# Patient Record
Sex: MALE | Race: WHITE | NOT HISPANIC OR LATINO
[De-identification: names, ages, dates, MRNs, and addresses within clinical notes are randomized per-mention and may not be internally consistent; named-entity substitution may affect disease eponyms.]

---

## 2018-07-10 ENCOUNTER — APPOINTMENT (OUTPATIENT)
Dept: ORTHOPEDIC SURGERY | Facility: CLINIC | Age: 73
End: 2018-07-10
Payer: MEDICARE

## 2018-07-10 VITALS
HEART RATE: 77 BPM | BODY MASS INDEX: 33.64 KG/M2 | WEIGHT: 235 LBS | HEIGHT: 70 IN | SYSTOLIC BLOOD PRESSURE: 126 MMHG | DIASTOLIC BLOOD PRESSURE: 99 MMHG

## 2018-07-10 PROCEDURE — 20610 DRAIN/INJ JOINT/BURSA W/O US: CPT | Mod: LT

## 2018-07-10 PROCEDURE — 72110 X-RAY EXAM L-2 SPINE 4/>VWS: CPT

## 2018-07-10 PROCEDURE — 72170 X-RAY EXAM OF PELVIS: CPT

## 2018-07-10 PROCEDURE — 99215 OFFICE O/P EST HI 40 MIN: CPT | Mod: 25

## 2018-08-02 ENCOUNTER — APPOINTMENT (OUTPATIENT)
Dept: ORTHOPEDIC SURGERY | Facility: CLINIC | Age: 73
End: 2018-08-02
Payer: MEDICARE

## 2018-08-02 PROCEDURE — 99213 OFFICE O/P EST LOW 20 MIN: CPT

## 2018-08-09 ENCOUNTER — APPOINTMENT (OUTPATIENT)
Dept: ORTHOPEDIC SURGERY | Facility: CLINIC | Age: 73
End: 2018-08-09
Payer: MEDICARE

## 2018-08-09 PROCEDURE — 73110 X-RAY EXAM OF WRIST: CPT | Mod: RT

## 2018-08-09 PROCEDURE — 99214 OFFICE O/P EST MOD 30 MIN: CPT

## 2018-11-27 ENCOUNTER — APPOINTMENT (OUTPATIENT)
Dept: ORTHOPEDIC SURGERY | Facility: CLINIC | Age: 73
End: 2018-11-27
Payer: MEDICARE

## 2018-11-27 PROCEDURE — 73564 X-RAY EXAM KNEE 4 OR MORE: CPT | Mod: 50

## 2018-11-27 PROCEDURE — 99214 OFFICE O/P EST MOD 30 MIN: CPT

## 2018-11-27 PROCEDURE — 73502 X-RAY EXAM HIP UNI 2-3 VIEWS: CPT

## 2018-12-06 ENCOUNTER — APPOINTMENT (OUTPATIENT)
Dept: ORTHOPEDIC SURGERY | Facility: CLINIC | Age: 73
End: 2018-12-06
Payer: MEDICARE

## 2018-12-06 VITALS
DIASTOLIC BLOOD PRESSURE: 72 MMHG | HEIGHT: 70 IN | WEIGHT: 235 LBS | HEART RATE: 91 BPM | BODY MASS INDEX: 33.64 KG/M2 | SYSTOLIC BLOOD PRESSURE: 119 MMHG

## 2018-12-06 DIAGNOSIS — M47.12 OTHER SPONDYLOSIS WITH MYELOPATHY, CERVICAL REGION: ICD-10-CM

## 2018-12-06 DIAGNOSIS — Z86.39 PERSONAL HISTORY OF OTHER ENDOCRINE, NUTRITIONAL AND METABOLIC DISEASE: ICD-10-CM

## 2018-12-06 DIAGNOSIS — Z87.39 PERSONAL HISTORY OF OTHER DISEASES OF THE MUSCULOSKELETAL SYSTEM AND CONNECTIVE TISSUE: ICD-10-CM

## 2018-12-06 DIAGNOSIS — Z86.79 PERSONAL HISTORY OF OTHER DISEASES OF THE CIRCULATORY SYSTEM: ICD-10-CM

## 2018-12-06 DIAGNOSIS — Z60.2 PROBLEMS RELATED TO LIVING ALONE: ICD-10-CM

## 2018-12-06 DIAGNOSIS — Z80.3 FAMILY HISTORY OF MALIGNANT NEOPLASM OF BREAST: ICD-10-CM

## 2018-12-06 DIAGNOSIS — Z78.9 OTHER SPECIFIED HEALTH STATUS: ICD-10-CM

## 2018-12-06 DIAGNOSIS — Z87.19 PERSONAL HISTORY OF OTHER DISEASES OF THE DIGESTIVE SYSTEM: ICD-10-CM

## 2018-12-06 DIAGNOSIS — Z80.42 FAMILY HISTORY OF MALIGNANT NEOPLASM OF PROSTATE: ICD-10-CM

## 2018-12-06 DIAGNOSIS — Z87.891 PERSONAL HISTORY OF NICOTINE DEPENDENCE: ICD-10-CM

## 2018-12-06 PROCEDURE — 99215 OFFICE O/P EST HI 40 MIN: CPT

## 2018-12-06 RX ORDER — AMLODIPINE BESYLATE 5 MG/1
TABLET ORAL
Refills: 0 | Status: ACTIVE | COMMUNITY

## 2018-12-06 RX ORDER — BUTALBITAL, ASPIRIN AND CAFFEINE 50; 325; 40 MG/1; MG/1; MG/1
TABLET ORAL
Refills: 0 | Status: ACTIVE | COMMUNITY

## 2018-12-06 RX ORDER — LOSARTAN POTASSIUM 100 MG/1
TABLET, FILM COATED ORAL
Refills: 0 | Status: ACTIVE | COMMUNITY

## 2018-12-06 RX ORDER — LEVOTHYROXINE SODIUM 0.17 MG/1
TABLET ORAL
Refills: 0 | Status: ACTIVE | COMMUNITY

## 2018-12-06 RX ORDER — SIMVASTATIN 80 MG/1
TABLET, FILM COATED ORAL
Refills: 0 | Status: ACTIVE | COMMUNITY

## 2018-12-06 RX ORDER — METHYLPREDNISOLONE 4 MG/1
4 TABLET ORAL
Qty: 21 | Refills: 0 | Status: DISCONTINUED | COMMUNITY
Start: 2018-07-10 | End: 2018-12-06

## 2018-12-06 RX ORDER — PROPRANOLOL HYDROCHLORIDE 60 MG/1
60 CAPSULE, EXTENDED RELEASE ORAL
Refills: 0 | Status: ACTIVE | COMMUNITY

## 2018-12-06 RX ORDER — ASPIRIN 81 MG
81 TABLET, DELAYED RELEASE (ENTERIC COATED) ORAL
Refills: 0 | Status: ACTIVE | COMMUNITY

## 2018-12-06 SDOH — SOCIAL STABILITY - SOCIAL INSECURITY: PROBLEMS RELATED TO LIVING ALONE: Z60.2

## 2018-12-10 ENCOUNTER — MEDICATION RENEWAL (OUTPATIENT)
Age: 73
End: 2018-12-10

## 2018-12-11 ENCOUNTER — RESULT REVIEW (OUTPATIENT)
Age: 73
End: 2018-12-11

## 2018-12-12 ENCOUNTER — OTHER (OUTPATIENT)
Age: 73
End: 2018-12-12

## 2018-12-13 ENCOUNTER — FORM ENCOUNTER (OUTPATIENT)
Age: 73
End: 2018-12-13

## 2018-12-14 ENCOUNTER — APPOINTMENT (OUTPATIENT)
Dept: MRI IMAGING | Facility: CLINIC | Age: 73
End: 2018-12-14
Payer: MEDICARE

## 2018-12-14 ENCOUNTER — OUTPATIENT (OUTPATIENT)
Dept: OUTPATIENT SERVICES | Facility: HOSPITAL | Age: 73
LOS: 1 days | End: 2018-12-14
Payer: MEDICARE

## 2018-12-14 DIAGNOSIS — Z98.89 OTHER SPECIFIED POSTPROCEDURAL STATES: Chronic | ICD-10-CM

## 2018-12-14 DIAGNOSIS — Z96.60 PRESENCE OF UNSPECIFIED ORTHOPEDIC JOINT IMPLANT: Chronic | ICD-10-CM

## 2018-12-14 DIAGNOSIS — Z96.651 PRESENCE OF RIGHT ARTIFICIAL KNEE JOINT: Chronic | ICD-10-CM

## 2018-12-14 DIAGNOSIS — M47.12 OTHER SPONDYLOSIS WITH MYELOPATHY, CERVICAL REGION: ICD-10-CM

## 2018-12-14 DIAGNOSIS — Z96.659 PRESENCE OF UNSPECIFIED ARTIFICIAL KNEE JOINT: Chronic | ICD-10-CM

## 2018-12-14 PROCEDURE — 72141 MRI NECK SPINE W/O DYE: CPT

## 2018-12-14 PROCEDURE — 72141 MRI NECK SPINE W/O DYE: CPT | Mod: 26

## 2018-12-18 ENCOUNTER — APPOINTMENT (OUTPATIENT)
Dept: ORTHOPEDIC SURGERY | Facility: CLINIC | Age: 73
End: 2018-12-18
Payer: MEDICARE

## 2018-12-18 VITALS — HEIGHT: 70 IN | BODY MASS INDEX: 33.64 KG/M2 | WEIGHT: 235 LBS

## 2018-12-18 PROCEDURE — 20605 DRAIN/INJ JOINT/BURSA W/O US: CPT | Mod: RT

## 2018-12-18 PROCEDURE — 99213 OFFICE O/P EST LOW 20 MIN: CPT | Mod: 25

## 2019-01-07 ENCOUNTER — APPOINTMENT (OUTPATIENT)
Dept: NEUROLOGY | Facility: CLINIC | Age: 74
End: 2019-01-07

## 2019-01-28 ENCOUNTER — APPOINTMENT (OUTPATIENT)
Dept: ORTHOPEDIC SURGERY | Facility: CLINIC | Age: 74
End: 2019-01-28

## 2019-03-05 ENCOUNTER — INPATIENT (INPATIENT)
Facility: HOSPITAL | Age: 74
LOS: 1 days | Discharge: HOME | DRG: 194 | End: 2019-03-07
Attending: INTERNAL MEDICINE | Admitting: INTERNAL MEDICINE
Payer: MEDICARE

## 2019-03-05 VITALS
HEIGHT: 70 IN | SYSTOLIC BLOOD PRESSURE: 157 MMHG | TEMPERATURE: 102 F | OXYGEN SATURATION: 90 % | DIASTOLIC BLOOD PRESSURE: 84 MMHG | HEART RATE: 100 BPM | WEIGHT: 229.94 LBS

## 2019-03-05 DIAGNOSIS — Z98.89 OTHER SPECIFIED POSTPROCEDURAL STATES: Chronic | ICD-10-CM

## 2019-03-05 DIAGNOSIS — Z96.659 PRESENCE OF UNSPECIFIED ARTIFICIAL KNEE JOINT: Chronic | ICD-10-CM

## 2019-03-05 DIAGNOSIS — J10.1 INFLUENZA DUE TO OTHER IDENTIFIED INFLUENZA VIRUS WITH OTHER RESPIRATORY MANIFESTATIONS: ICD-10-CM

## 2019-03-05 DIAGNOSIS — Z96.60 PRESENCE OF UNSPECIFIED ORTHOPEDIC JOINT IMPLANT: Chronic | ICD-10-CM

## 2019-03-05 DIAGNOSIS — Z96.651 PRESENCE OF RIGHT ARTIFICIAL KNEE JOINT: Chronic | ICD-10-CM

## 2019-03-05 LAB
ALBUMIN SERPL ELPH-MCNC: 3.7 G/DL — SIGNIFICANT CHANGE UP (ref 3.3–5)
ALP SERPL-CCNC: 61 U/L — SIGNIFICANT CHANGE UP (ref 30–120)
ALT FLD-CCNC: 35 U/L DA — SIGNIFICANT CHANGE UP (ref 10–60)
ANION GAP SERPL CALC-SCNC: 13 MMOL/L — SIGNIFICANT CHANGE UP (ref 5–17)
APPEARANCE UR: CLEAR — SIGNIFICANT CHANGE UP
APTT BLD: 29.8 SEC — SIGNIFICANT CHANGE UP (ref 28.5–37)
AST SERPL-CCNC: 38 U/L — SIGNIFICANT CHANGE UP (ref 10–40)
BASOPHILS # BLD AUTO: 0.03 K/UL — SIGNIFICANT CHANGE UP (ref 0–0.2)
BASOPHILS NFR BLD AUTO: 0.4 % — SIGNIFICANT CHANGE UP (ref 0–2)
BILIRUB SERPL-MCNC: 0.4 MG/DL — SIGNIFICANT CHANGE UP (ref 0.2–1.2)
BILIRUB UR-MCNC: NEGATIVE — SIGNIFICANT CHANGE UP
BUN SERPL-MCNC: 15 MG/DL — SIGNIFICANT CHANGE UP (ref 7–23)
CALCIUM SERPL-MCNC: 8.6 MG/DL — SIGNIFICANT CHANGE UP (ref 8.4–10.5)
CHLORIDE SERPL-SCNC: 100 MMOL/L — SIGNIFICANT CHANGE UP (ref 96–108)
CO2 SERPL-SCNC: 22 MMOL/L — SIGNIFICANT CHANGE UP (ref 22–31)
COLOR SPEC: YELLOW — SIGNIFICANT CHANGE UP
CREAT SERPL-MCNC: 1.13 MG/DL — SIGNIFICANT CHANGE UP (ref 0.5–1.3)
D DIMER BLD IA.RAPID-MCNC: 2092 NG/ML DDU — HIGH
DIFF PNL FLD: ABNORMAL
EOSINOPHIL # BLD AUTO: 0.02 K/UL — SIGNIFICANT CHANGE UP (ref 0–0.5)
EOSINOPHIL NFR BLD AUTO: 0.3 % — SIGNIFICANT CHANGE UP (ref 0–6)
FLU A RESULT: DETECTED
FLU A RESULT: DETECTED
FLUAV AG NPH QL: DETECTED
FLUBV AG NPH QL: SIGNIFICANT CHANGE UP
GLUCOSE SERPL-MCNC: 117 MG/DL — HIGH (ref 70–99)
GLUCOSE UR QL: NEGATIVE MG/DL — SIGNIFICANT CHANGE UP
HCT VFR BLD CALC: 34.5 % — LOW (ref 39–50)
HGB BLD-MCNC: 11.9 G/DL — LOW (ref 13–17)
IMM GRANULOCYTES NFR BLD AUTO: 0.3 % — SIGNIFICANT CHANGE UP (ref 0–1.5)
INR BLD: 1.24 RATIO — HIGH (ref 0.88–1.16)
KETONES UR-MCNC: ABNORMAL
LACTATE SERPL-SCNC: 1.4 MMOL/L — SIGNIFICANT CHANGE UP (ref 0.7–2)
LEUKOCYTE ESTERASE UR-ACNC: NEGATIVE — SIGNIFICANT CHANGE UP
LYMPHOCYTES # BLD AUTO: 0.45 K/UL — LOW (ref 1–3.3)
LYMPHOCYTES # BLD AUTO: 6.7 % — LOW (ref 13–44)
MAGNESIUM SERPL-MCNC: 2 MG/DL — SIGNIFICANT CHANGE UP (ref 1.6–2.6)
MCHC RBC-ENTMCNC: 32.2 PG — SIGNIFICANT CHANGE UP (ref 27–34)
MCHC RBC-ENTMCNC: 34.5 GM/DL — SIGNIFICANT CHANGE UP (ref 32–36)
MCV RBC AUTO: 93.2 FL — SIGNIFICANT CHANGE UP (ref 80–100)
MONOCYTES # BLD AUTO: 0.77 K/UL — SIGNIFICANT CHANGE UP (ref 0–0.9)
MONOCYTES NFR BLD AUTO: 11.5 % — SIGNIFICANT CHANGE UP (ref 2–14)
NEUTROPHILS # BLD AUTO: 5.4 K/UL — SIGNIFICANT CHANGE UP (ref 1.8–7.4)
NEUTROPHILS NFR BLD AUTO: 80.8 % — HIGH (ref 43–77)
NITRITE UR-MCNC: NEGATIVE — SIGNIFICANT CHANGE UP
NRBC # BLD: 0 /100 WBCS — SIGNIFICANT CHANGE UP (ref 0–0)
NT-PROBNP SERPL-SCNC: 417 PG/ML — SIGNIFICANT CHANGE UP (ref 0–450)
PH UR: 6 — SIGNIFICANT CHANGE UP (ref 5–8)
PLATELET # BLD AUTO: 146 K/UL — LOW (ref 150–400)
POTASSIUM SERPL-MCNC: 3.5 MMOL/L — SIGNIFICANT CHANGE UP (ref 3.5–5.3)
POTASSIUM SERPL-SCNC: 3.5 MMOL/L — SIGNIFICANT CHANGE UP (ref 3.5–5.3)
PROT SERPL-MCNC: 7.1 G/DL — SIGNIFICANT CHANGE UP (ref 6–8.3)
PROT UR-MCNC: 30 MG/DL
PROTHROM AB SERPL-ACNC: 13.6 SEC — HIGH (ref 10–12.9)
RBC # BLD: 3.7 M/UL — LOW (ref 4.2–5.8)
RBC # FLD: 13.6 % — SIGNIFICANT CHANGE UP (ref 10.3–14.5)
RSV RESULT: SIGNIFICANT CHANGE UP
RSV RNA RESP QL NAA+PROBE: SIGNIFICANT CHANGE UP
SODIUM SERPL-SCNC: 135 MMOL/L — SIGNIFICANT CHANGE UP (ref 135–145)
SP GR SPEC: 1.01 — SIGNIFICANT CHANGE UP (ref 1.01–1.02)
TROPONIN I SERPL-MCNC: 0.03 NG/ML — SIGNIFICANT CHANGE UP (ref 0.02–0.06)
TROPONIN I SERPL-MCNC: 0.49 NG/ML — HIGH (ref 0.02–0.06)
TROPONIN I SERPL-MCNC: 0.51 NG/ML — HIGH (ref 0.02–0.06)
UROBILINOGEN FLD QL: NEGATIVE MG/DL — SIGNIFICANT CHANGE UP
WBC # BLD: 6.69 K/UL — SIGNIFICANT CHANGE UP (ref 3.8–10.5)
WBC # FLD AUTO: 6.69 K/UL — SIGNIFICANT CHANGE UP (ref 3.8–10.5)

## 2019-03-05 PROCEDURE — 93010 ELECTROCARDIOGRAM REPORT: CPT

## 2019-03-05 PROCEDURE — 73562 X-RAY EXAM OF KNEE 3: CPT | Mod: 26,50

## 2019-03-05 PROCEDURE — 71045 X-RAY EXAM CHEST 1 VIEW: CPT | Mod: 26

## 2019-03-05 PROCEDURE — 71275 CT ANGIOGRAPHY CHEST: CPT | Mod: 26

## 2019-03-05 PROCEDURE — 73521 X-RAY EXAM HIPS BI 2 VIEWS: CPT | Mod: 26

## 2019-03-05 PROCEDURE — 99285 EMERGENCY DEPT VISIT HI MDM: CPT

## 2019-03-05 RX ORDER — PROPRANOLOL HCL 160 MG
60 CAPSULE, EXTENDED RELEASE 24HR ORAL DAILY
Qty: 0 | Refills: 0 | Status: DISCONTINUED | OUTPATIENT
Start: 2019-03-05 | End: 2019-03-07

## 2019-03-05 RX ORDER — LEVOTHYROXINE SODIUM 125 MCG
25 TABLET ORAL DAILY
Qty: 0 | Refills: 0 | Status: DISCONTINUED | OUTPATIENT
Start: 2019-03-05 | End: 2019-03-07

## 2019-03-05 RX ORDER — PANTOPRAZOLE SODIUM 20 MG/1
40 TABLET, DELAYED RELEASE ORAL
Qty: 0 | Refills: 0 | Status: DISCONTINUED | OUTPATIENT
Start: 2019-03-05 | End: 2019-03-05

## 2019-03-05 RX ORDER — ENOXAPARIN SODIUM 100 MG/ML
40 INJECTION SUBCUTANEOUS DAILY
Qty: 0 | Refills: 0 | Status: DISCONTINUED | OUTPATIENT
Start: 2019-03-05 | End: 2019-03-07

## 2019-03-05 RX ORDER — ACETAMINOPHEN 500 MG
975 TABLET ORAL ONCE
Qty: 0 | Refills: 0 | Status: COMPLETED | OUTPATIENT
Start: 2019-03-05 | End: 2019-03-05

## 2019-03-05 RX ORDER — SIMVASTATIN 20 MG/1
40 TABLET, FILM COATED ORAL AT BEDTIME
Qty: 0 | Refills: 0 | Status: DISCONTINUED | OUTPATIENT
Start: 2019-03-05 | End: 2019-03-07

## 2019-03-05 RX ORDER — CLOPIDOGREL BISULFATE 75 MG/1
75 TABLET, FILM COATED ORAL DAILY
Qty: 0 | Refills: 0 | Status: DISCONTINUED | OUTPATIENT
Start: 2019-03-05 | End: 2019-03-07

## 2019-03-05 RX ORDER — ACETAMINOPHEN 500 MG
650 TABLET ORAL ONCE
Qty: 0 | Refills: 0 | Status: DISCONTINUED | OUTPATIENT
Start: 2019-03-05 | End: 2019-03-05

## 2019-03-05 RX ORDER — SODIUM CHLORIDE 9 MG/ML
1000 INJECTION INTRAMUSCULAR; INTRAVENOUS; SUBCUTANEOUS ONCE
Qty: 0 | Refills: 0 | Status: COMPLETED | OUTPATIENT
Start: 2019-03-05 | End: 2019-03-05

## 2019-03-05 RX ORDER — ACETAMINOPHEN 500 MG
975 TABLET ORAL EVERY 8 HOURS
Qty: 0 | Refills: 0 | Status: DISCONTINUED | OUTPATIENT
Start: 2019-03-05 | End: 2019-03-07

## 2019-03-05 RX ORDER — PANTOPRAZOLE SODIUM 20 MG/1
40 TABLET, DELAYED RELEASE ORAL
Qty: 0 | Refills: 0 | Status: DISCONTINUED | OUTPATIENT
Start: 2019-03-05 | End: 2019-03-07

## 2019-03-05 RX ORDER — AMLODIPINE BESYLATE 2.5 MG/1
5 TABLET ORAL DAILY
Qty: 0 | Refills: 0 | Status: DISCONTINUED | OUTPATIENT
Start: 2019-03-05 | End: 2019-03-07

## 2019-03-05 RX ORDER — TAMSULOSIN HYDROCHLORIDE 0.4 MG/1
0.4 CAPSULE ORAL AT BEDTIME
Qty: 0 | Refills: 0 | Status: DISCONTINUED | OUTPATIENT
Start: 2019-03-05 | End: 2019-03-07

## 2019-03-05 RX ORDER — LOSARTAN POTASSIUM 100 MG/1
100 TABLET, FILM COATED ORAL DAILY
Qty: 0 | Refills: 0 | Status: DISCONTINUED | OUTPATIENT
Start: 2019-03-05 | End: 2019-03-07

## 2019-03-05 RX ORDER — ASPIRIN/CALCIUM CARB/MAGNESIUM 324 MG
81 TABLET ORAL DAILY
Qty: 0 | Refills: 0 | Status: DISCONTINUED | OUTPATIENT
Start: 2019-03-05 | End: 2019-03-07

## 2019-03-05 RX ADMIN — Medication 975 MILLIGRAM(S): at 01:05

## 2019-03-05 RX ADMIN — Medication 975 MILLIGRAM(S): at 18:26

## 2019-03-05 RX ADMIN — Medication 75 MILLIGRAM(S): at 02:29

## 2019-03-05 RX ADMIN — Medication 975 MILLIGRAM(S): at 01:04

## 2019-03-05 RX ADMIN — Medication 975 MILLIGRAM(S): at 10:00

## 2019-03-05 RX ADMIN — SODIUM CHLORIDE 1000 MILLILITER(S): 9 INJECTION INTRAMUSCULAR; INTRAVENOUS; SUBCUTANEOUS at 02:05

## 2019-03-05 RX ADMIN — Medication 25 MICROGRAM(S): at 10:35

## 2019-03-05 RX ADMIN — PANTOPRAZOLE SODIUM 40 MILLIGRAM(S): 20 TABLET, DELAYED RELEASE ORAL at 10:36

## 2019-03-05 RX ADMIN — Medication 975 MILLIGRAM(S): at 17:35

## 2019-03-05 RX ADMIN — SODIUM CHLORIDE 1000 MILLILITER(S): 9 INJECTION INTRAMUSCULAR; INTRAVENOUS; SUBCUTANEOUS at 03:05

## 2019-03-05 RX ADMIN — Medication 75 MILLIGRAM(S): at 10:36

## 2019-03-05 RX ADMIN — TAMSULOSIN HYDROCHLORIDE 0.4 MILLIGRAM(S): 0.4 CAPSULE ORAL at 21:49

## 2019-03-05 RX ADMIN — Medication 975 MILLIGRAM(S): at 09:19

## 2019-03-05 RX ADMIN — SODIUM CHLORIDE 1000 MILLILITER(S): 9 INJECTION INTRAMUSCULAR; INTRAVENOUS; SUBCUTANEOUS at 01:05

## 2019-03-05 RX ADMIN — SIMVASTATIN 40 MILLIGRAM(S): 20 TABLET, FILM COATED ORAL at 21:49

## 2019-03-05 RX ADMIN — CLOPIDOGREL BISULFATE 75 MILLIGRAM(S): 75 TABLET, FILM COATED ORAL at 10:36

## 2019-03-05 RX ADMIN — Medication 81 MILLIGRAM(S): at 10:36

## 2019-03-05 RX ADMIN — ENOXAPARIN SODIUM 40 MILLIGRAM(S): 100 INJECTION SUBCUTANEOUS at 10:40

## 2019-03-05 NOTE — H&P ADULT - HISTORY OF PRESENT ILLNESS
73 male from home  brought by ems as 2nd fall as legs weeak and ongoing uri with high fever now  + flu vaccine - has + flu by swab here  well untill 2 days ago with mild uri sx that progressed mostly with weakness  no ha cp sob rash dysuria +dry cough +low fever -now high no ear pains  no bowel issue no edema  stable mild oa and back pains and leg weakness  stable mild hearing loss and tinitus  no bleeding bruising heartburn  does have vines -tested for cardiac for expected back surgery (as lumbar radiculopathy with some foot drop -somewhat improved now, found with cad 2 months ago and stents placed, does have some vines with stairs but not at rest, had retesting by cardio dr jaxsonr -results not known offically at this time.   no cp palpitations orthopnea pnd    hx htn lipid cad hypothyroid oa radiculopathy gerd samantha overwt foot drop bph migraines

## 2019-03-05 NOTE — H&P ADULT - FAMILY HISTORY
Family history of breast cancer, with mets     Family history of type 2 diabetes mellitus     Family history of prostate cancer     Sibling  Still living? Yes, Estimated age:   Family history of dementia, Age at diagnosis: Age Unknown  Family history of hypertension, Age at diagnosis: Age Unknown

## 2019-03-05 NOTE — ED PROVIDER NOTE - PSH
S/P carpal tunnel release  left with ulnar nerve transposition 2009  S/P carpal tunnel release  right with ulnar nerve transposition 2009  S/P colon resection  with temporary colostomy for benign mass 1993  S/P colonoscopy  2 years ago, negative  S/P endoscopy  April 2015, mild gastritis  S/P knee replacement  left 4/2014  S/P tonsillectomy and adenoidectomy  as child  S/P total hip arthroplasty  left 2013  S/P total hip arthroplasty  right 2012  S/P total knee arthroplasty, right  2011

## 2019-03-05 NOTE — H&P ADULT - NSHPLABSRESULTS_GEN_ALL_CORE
sugar 117  bun/crat ok lytes normal  lactate normal  wbc 6.9  hgb 11.9  ua neg  ct chest  no pe   slight patchy infiltrates -prob atelectasis    small nodue (has hx of this)    ekg reportedly sinus    repeat ordered as no available

## 2019-03-05 NOTE — H&P ADULT - NSHPPHYSICALEXAM_GEN_ALL_CORE
awake alert clear mentation  pink  120/70  102  no jvd  no nodes   lungs clear   heart reg  barrel chested  abd large soft tympantin  no hernia no masses  no edema  no rash    decline rectal   throat slight redness  hearing grossly intact  sight intact  not walked at this time but no gross deficits

## 2019-03-05 NOTE — ED PROVIDER NOTE - OBJECTIVE STATEMENT
73 y.o. M [facility] for weakness 73 y.o. M BIB EMS for weakness - about 1.5months ago had 2 stents placed after abn stress test, since then has had sensation of SOB, last week had another angio to check stents and they were clean, now 2d cough and rhinorrhea, today fell 3times due to leg weakness, says just can't stand up, has b/l hip replacements and 73 y.o. M [facility] for weakness - about 1.5months ago had 2 stents placed after abn stress test, since then has had sensation of SOB, last week had another angio to check stents and they were clean, now 2d cough and rhinorrhea, today fell 3 times due to leg weakness, says just can't stand up, has b/l hip replacements and hips hurt now, not sure if that is his baseline or from the fall, has been taking robitussin dm and benedryl for URI symptoms

## 2019-03-05 NOTE — ED ADULT NURSE NOTE - PMH
Dyslipidemia    Gastroesophageal reflux disease without esophagitis  controlled on omeprazole  Heart murmur  as child  Hernia of abdominal wall    Hypertension    Hypothyroid    Migraine    Obesity    NISHI on CPAP  unsure setting  Osteoarthritis    Stented coronary artery    Tachycardia  12 years ago  Wrist pain, right

## 2019-03-05 NOTE — ED PROVIDER NOTE - PMH
Dyslipidemia    Gastroesophageal reflux disease without esophagitis  controlled on omeprazole  Heart murmur  as child  Hernia of abdominal wall    Hypertension    Hypothyroid    Migraine    Obesity    NISHI on CPAP  unsure setting  Osteoarthritis    Tachycardia  12 years ago  Wrist pain, right

## 2019-03-05 NOTE — ED ADULT NURSE NOTE - FAMILY HISTORY
Mother  Still living? No  Family history of breast cancer, Age at diagnosis: Age Unknown  Family history of type 2 diabetes mellitus, Age at diagnosis: Age Unknown     Father  Still living? No  Family history of prostate cancer, Age at diagnosis: Age Unknown     Sibling  Still living? Yes, Estimated age:   Family history of dementia, Age at diagnosis: Age Unknown  Family history of hypertension, Age at diagnosis: Age Unknown

## 2019-03-06 ENCOUNTER — TRANSCRIPTION ENCOUNTER (OUTPATIENT)
Age: 74
End: 2019-03-06

## 2019-03-06 DIAGNOSIS — I25.10 ATHEROSCLEROTIC HEART DISEASE OF NATIVE CORONARY ARTERY WITHOUT ANGINA PECTORIS: ICD-10-CM

## 2019-03-06 DIAGNOSIS — R74.8 ABNORMAL LEVELS OF OTHER SERUM ENZYMES: ICD-10-CM

## 2019-03-06 LAB
ALBUMIN SERPL ELPH-MCNC: 3.1 G/DL — LOW (ref 3.3–5)
ALP SERPL-CCNC: 52 U/L — SIGNIFICANT CHANGE UP (ref 30–120)
ALT FLD-CCNC: 34 U/L DA — SIGNIFICANT CHANGE UP (ref 10–60)
ANION GAP SERPL CALC-SCNC: 12 MMOL/L — SIGNIFICANT CHANGE UP (ref 5–17)
ANION GAP SERPL CALC-SCNC: 9 MMOL/L — SIGNIFICANT CHANGE UP (ref 5–17)
AST SERPL-CCNC: 51 U/L — HIGH (ref 10–40)
BILIRUB SERPL-MCNC: 0.2 MG/DL — SIGNIFICANT CHANGE UP (ref 0.2–1.2)
BUN SERPL-MCNC: 12 MG/DL — SIGNIFICANT CHANGE UP (ref 7–23)
BUN SERPL-MCNC: 13 MG/DL — SIGNIFICANT CHANGE UP (ref 7–23)
CALCIUM SERPL-MCNC: 7.9 MG/DL — LOW (ref 8.4–10.5)
CALCIUM SERPL-MCNC: 8 MG/DL — LOW (ref 8.4–10.5)
CHLORIDE SERPL-SCNC: 104 MMOL/L — SIGNIFICANT CHANGE UP (ref 96–108)
CHLORIDE SERPL-SCNC: 105 MMOL/L — SIGNIFICANT CHANGE UP (ref 96–108)
CHOLEST SERPL-MCNC: 118 MG/DL — SIGNIFICANT CHANGE UP (ref 10–199)
CO2 SERPL-SCNC: 22 MMOL/L — SIGNIFICANT CHANGE UP (ref 22–31)
CO2 SERPL-SCNC: 25 MMOL/L — SIGNIFICANT CHANGE UP (ref 22–31)
CREAT SERPL-MCNC: 0.9 MG/DL — SIGNIFICANT CHANGE UP (ref 0.5–1.3)
CREAT SERPL-MCNC: 0.93 MG/DL — SIGNIFICANT CHANGE UP (ref 0.5–1.3)
GLUCOSE SERPL-MCNC: 114 MG/DL — HIGH (ref 70–99)
GLUCOSE SERPL-MCNC: 96 MG/DL — SIGNIFICANT CHANGE UP (ref 70–99)
HCT VFR BLD CALC: 32.6 % — LOW (ref 39–50)
HDLC SERPL-MCNC: 37 MG/DL — LOW
HGB BLD-MCNC: 11.1 G/DL — LOW (ref 13–17)
LIPID PNL WITH DIRECT LDL SERPL: 56 MG/DL — SIGNIFICANT CHANGE UP
MCHC RBC-ENTMCNC: 31.2 PG — SIGNIFICANT CHANGE UP (ref 27–34)
MCHC RBC-ENTMCNC: 34 GM/DL — SIGNIFICANT CHANGE UP (ref 32–36)
MCV RBC AUTO: 91.6 FL — SIGNIFICANT CHANGE UP (ref 80–100)
NRBC # BLD: 0 /100 WBCS — SIGNIFICANT CHANGE UP (ref 0–0)
PLATELET # BLD AUTO: 135 K/UL — LOW (ref 150–400)
POTASSIUM SERPL-MCNC: 3.1 MMOL/L — LOW (ref 3.5–5.3)
POTASSIUM SERPL-MCNC: 3.7 MMOL/L — SIGNIFICANT CHANGE UP (ref 3.5–5.3)
POTASSIUM SERPL-SCNC: 3.1 MMOL/L — LOW (ref 3.5–5.3)
POTASSIUM SERPL-SCNC: 3.7 MMOL/L — SIGNIFICANT CHANGE UP (ref 3.5–5.3)
PROT SERPL-MCNC: 6 G/DL — SIGNIFICANT CHANGE UP (ref 6–8.3)
RBC # BLD: 3.56 M/UL — LOW (ref 4.2–5.8)
RBC # FLD: 13.9 % — SIGNIFICANT CHANGE UP (ref 10.3–14.5)
SODIUM SERPL-SCNC: 138 MMOL/L — SIGNIFICANT CHANGE UP (ref 135–145)
SODIUM SERPL-SCNC: 139 MMOL/L — SIGNIFICANT CHANGE UP (ref 135–145)
T4 FREE SERPL-MCNC: 1.1 NG/DL — SIGNIFICANT CHANGE UP (ref 0.9–1.8)
TOTAL CHOLESTEROL/HDL RATIO MEASUREMENT: 3.2 RATIO — LOW (ref 3.4–9.6)
TRIGL SERPL-MCNC: 124 MG/DL — SIGNIFICANT CHANGE UP (ref 10–149)
TROPONIN I SERPL-MCNC: 0.21 NG/ML — HIGH (ref 0.02–0.06)
TSH SERPL-MCNC: 4.91 UIU/ML — HIGH (ref 0.27–4.2)
VIT B12 SERPL-MCNC: 608 PG/ML — SIGNIFICANT CHANGE UP (ref 232–1245)
WBC # BLD: 3.35 K/UL — LOW (ref 3.8–10.5)
WBC # FLD AUTO: 3.35 K/UL — LOW (ref 3.8–10.5)

## 2019-03-06 PROCEDURE — 99223 1ST HOSP IP/OBS HIGH 75: CPT

## 2019-03-06 PROCEDURE — 93010 ELECTROCARDIOGRAM REPORT: CPT

## 2019-03-06 PROCEDURE — 12345: CPT | Mod: NC

## 2019-03-06 RX ORDER — POTASSIUM CHLORIDE 20 MEQ
20 PACKET (EA) ORAL ONCE
Qty: 0 | Refills: 0 | Status: COMPLETED | OUTPATIENT
Start: 2019-03-06 | End: 2019-03-06

## 2019-03-06 RX ORDER — POLYETHYLENE GLYCOL 3350 17 G/17G
17 POWDER, FOR SOLUTION ORAL DAILY
Qty: 0 | Refills: 0 | Status: DISCONTINUED | OUTPATIENT
Start: 2019-03-06 | End: 2019-03-07

## 2019-03-06 RX ADMIN — SIMVASTATIN 40 MILLIGRAM(S): 20 TABLET, FILM COATED ORAL at 21:25

## 2019-03-06 RX ADMIN — PANTOPRAZOLE SODIUM 40 MILLIGRAM(S): 20 TABLET, DELAYED RELEASE ORAL at 06:54

## 2019-03-06 RX ADMIN — Medication 20 MILLIEQUIVALENT(S): at 17:06

## 2019-03-06 RX ADMIN — ENOXAPARIN SODIUM 40 MILLIGRAM(S): 100 INJECTION SUBCUTANEOUS at 11:11

## 2019-03-06 RX ADMIN — TAMSULOSIN HYDROCHLORIDE 0.4 MILLIGRAM(S): 0.4 CAPSULE ORAL at 21:25

## 2019-03-06 RX ADMIN — Medication 60 MILLIGRAM(S): at 06:50

## 2019-03-06 RX ADMIN — Medication 25 MICROGRAM(S): at 06:50

## 2019-03-06 RX ADMIN — Medication 20 MILLIEQUIVALENT(S): at 12:39

## 2019-03-06 RX ADMIN — POLYETHYLENE GLYCOL 3350 17 GRAM(S): 17 POWDER, FOR SOLUTION ORAL at 11:12

## 2019-03-06 RX ADMIN — CLOPIDOGREL BISULFATE 75 MILLIGRAM(S): 75 TABLET, FILM COATED ORAL at 11:12

## 2019-03-06 RX ADMIN — Medication 75 MILLIGRAM(S): at 06:50

## 2019-03-06 RX ADMIN — Medication 75 MILLIGRAM(S): at 11:12

## 2019-03-06 RX ADMIN — Medication 200 MILLIGRAM(S): at 22:02

## 2019-03-06 RX ADMIN — Medication 81 MILLIGRAM(S): at 11:12

## 2019-03-06 NOTE — PROVIDER CONTACT NOTE (OTHER) - ACTION/TREATMENT ORDERED:
as per MD Mayer give one dose of potassium 20MEQ PO now and one dose of 20MEQ of potassium PO to be given at dinner time. repeat BMP at 2000 hours. will continue to monitor

## 2019-03-06 NOTE — PROGRESS NOTE ADULT - SUBJECTIVE AND OBJECTIVE BOX
med    improving   no fever  better strength  able to stand and take few steps to bathroom  recent cad and stents   troponin added and elevated   no sx    seen by cardio  some low bp lyesterday    a+ox3  nontoxic    seems much better  no cp sob orhtopnea pnd  no productive couch sob    heart reg  lungs clear  abd soft   no edema  pink warm  98   120/70  no edema  voids ok      wishes miralax    imp  flu -improving  cad  +troponin related to flu effects      for PT and to see walking strength assessment as weak and falls at home with initial illness  ?home tomorrow    dr cotter

## 2019-03-06 NOTE — DISCHARGE NOTE NURSING/CASE MANAGEMENT/SOCIAL WORK - NSSCNAMETXT_GEN_ALL_CORE
You are DECLINING HOME CARE and OPTING to CONTINUE with OUTPATIENT PT @  [facility] [phone]

## 2019-03-06 NOTE — CONSULT NOTE ADULT - PROBLEM SELECTOR RECOMMENDATION 2
s/p recent intervention and no new symptoms.  Current tropinin elevation likely due to increased demand and influenza. No need currently for further intervention unless new symptoms or issues arise.  Repeat ECG oliverio.

## 2019-03-06 NOTE — DISCHARGE NOTE NURSING/CASE MANAGEMENT/SOCIAL WORK - NSDCDPATPORTLINK_GEN_ALL_CORE
You can access the [facility] Patient Portal, offered by [facility], by registering with the following website: [email or web address]

## 2019-03-06 NOTE — PROVIDER CONTACT NOTE (OTHER) - ASSESSMENT
axox4, denies chest pain, sob, no complaints at this time tele monitor maintained in place with NSR. VSS. asymptomatic at this time

## 2019-03-06 NOTE — DISCHARGE NOTE PROVIDER - HOSPITAL COURSE
assessed and treated   cardio and PT assessment        improved 73 male with weakness and recent stent for cad -found with flu a;  assessed and treated   cardio and PT    had transient troponin elevation and low potassium -improved; elevated tsh noted            clinically improved

## 2019-03-06 NOTE — DISCHARGE NOTE PROVIDER - NSDCCPCAREPLAN_GEN_ALL_CORE_FT
PRINCIPAL DISCHARGE DIAGNOSIS  Problem: Influenza A  Assessment and Plan of Treatment:       SECONDARY DISCHARGE DIAGNOSES  Problem: Weakness  Assessment and Plan of Treatment:

## 2019-03-06 NOTE — CONSULT NOTE ADULT - SUBJECTIVE AND OBJECTIVE BOX
HPI:  73 male from home  brought by ems as 2nd fall as legs weeak and ongoing uri with high fever + flu vaccine and now + flu.  Reports well until 2 days ago with mild uri sx that progressed mostly with weakness, +dry cough +low fever. Cardiology consulted due to elevated tropinin. Denies Denies chest pain, orthopnea, paroxysmal nocturnal dyspnea, dizziness, lightheadedness, claudication, pre-syncope or syncope. Reports recently had + stress test and had CC at [place].  Stents placed.  Still had more SOB after and had to go back for repeat procedure and reports results were good.  Never had angina.  has RICHMOND at baseline and this is no different now. Primary cardiologist (Dr. Mat Weston)    PAST MEDICAL & SURGICAL HISTORY:  Stented coronary artery  Gastroesophageal reflux disease without esophagitis: controlled on omeprazole  Wrist pain, right  Obesity  Hernia of abdominal wall  Migraine  NISHI on CPAP: unsure setting  Heart murmur: as child  Tachycardia: 12 years ago  Hypothyroid  Dyslipidemia  Hypertension  Osteoarthritis  S/P knee replacement: left 2014  S/P colonoscopy: 2 years ago, negative  S/P endoscopy: 2015, mild gastritis  S/P tonsillectomy and adenoidectomy: as child  S/P total hip arthroplasty: left   S/P total hip arthroplasty: right   S/P total knee arthroplasty, right:   S/P carpal tunnel release: left with ulnar nerve transposition   S/P carpal tunnel release: right with ulnar nerve transposition 2009  S/P colon resection: with temporary colostomy for benign mass 1993  CAD    SOCIAL HISTORY: Non-Smoker/Social ETOH/ No Ilicit Drug use.    FAMILY HISTORY:  Family history of hypertension (Sibling)  Family history of dementia (Sibling)  Family history of prostate cancer  Family history of type 2 diabetes mellitus  Family history of breast cancer: with mets      Allergies  No Known Allergies    Home Medications:  amLODIPine 5 mg oral tablet: 1 tab(s) orally once a day (05 Mar 2019 08:01)  aspirin 81 mg oral tablet: 1 tab(s) orally once a day (05 Mar 2019 08:01)  Butalbital Compound 325 mg-50 mg-40 mg oral tablet: 2 tab(s) orally every 6 hours, As Needed- for headache  (05 Mar 2019 08:01)  levothyroxine 25 mcg (0.025 mg) oral tablet: 1 tab(s) orally once a day (05 Mar 2019 08:01)  losartan 100 mg oral tablet: 1 tab(s) orally once a day (05 Mar 2019 08:01)  pantoprazole 40 mg oral delayed release tablet: 1 tab(s) orally once a day (05 Mar 2019 08:01)  Plavix 75 mg oral tablet: 1 tab(s) orally once a day (05 Mar 2019 08:01)  propranolol 60 mg oral capsule, extended release: 1 cap(s) orally once a day (05 Mar 2019 08:01)  simvastatin 40 mg oral tablet: 1 tab(s) orally once a day (at bedtime) (05 Mar 2019 08:01)  tamsulosin 0.4 mg oral capsule: 1 cap(s) orally once a day (05 Mar 2019 08:01)      HOSPITAL MEDICATIONS:   MEDICATIONS  (STANDING):  amLODIPine   Tablet 5 milliGRAM(s) Oral daily  aspirin  chewable 81 milliGRAM(s) Oral daily  clopidogrel Tablet 75 milliGRAM(s) Oral daily  enoxaparin Injectable 40 milliGRAM(s) SubCutaneous daily  levothyroxine 25 MICROGram(s) Oral daily  losartan 100 milliGRAM(s) Oral daily  oseltamivir 75 milliGRAM(s) Oral two times a day  pantoprazole    Tablet 40 milliGRAM(s) Oral before breakfast  propranolol LA 60 milliGRAM(s) Oral daily  simvastatin 40 milliGRAM(s) Oral at bedtime  tamsulosin 0.4 milliGRAM(s) Oral at bedtime    MEDICATIONS  (PRN):  acetaminophen   Tablet .. 975 milliGRAM(s) Oral every 8 hours PRN Temp greater or equal to 38C (100.4F), Moderate Pain (4 - 6)      REVIEW OF SYSTEMS: 13 systems were reviewed and all negative except for comments above.    Vital Signs Last 24 Hrs  T(C): 37.2 (06 Mar 2019 05:57), Max: 38.2 (05 Mar 2019 17:31)  T(F): 99 (06 Mar 2019 05:57), Max: 100.7 (05 Mar 2019 17:31)  HR: 74 (06 Mar 2019 05:57) (73 - 89)  BP: 125/77 (06 Mar 2019 05:57) (98/64 - 125/77)  BP(mean): --  RR: 18 (06 Mar 2019 05:57) (16 - 25)  SpO2: 95% (06 Mar 2019 05:57) (93% - 95%)Daily     Daily Weight in k (06 Mar 2019 05:57)I&O's Summary    05 Mar 2019 07:01  -  06 Mar 2019 07:00  --------------------------------------------------------  IN: 0 mL / OUT: 550 mL / NET: -550 mL        PHYSICAL EXAM:  Constitutional: NAD, awake and alert, well-developed  HEENT: PERRLA, EOMI,  No oral cyanosis. Oropharynx Clean and Dry. slight erythema in pharnyx  Neck:  supple,  No JVD, No Thyroid enlargement. No Carotid Bruits bilaterally.  Respiratory: Breath sounds are clear bilaterally, No wheezing, rales or rhonchi  Cardiovascular: NL S1 and S2, RRR, 1/6 SANDRA to LLSB  Gastrointestinal: Bowel Sounds present, soft  Extremities: No peripheral edema. No clubbing or cyanosis.    Vascular: 2+ peripheral pulses in LE   Neurological: A/O x 3, no focal motor deficits  Musculoskeletal: no calf tenderness .  Skin: No rashes.      LABS: All Labs Reviewed:                        11.9   6.69  )-----------( 146      ( 05 Mar 2019 01:27 )             34.5     05 Mar 2019 01:27    135    |  100    |  15     ----------------------------<  117    3.5     |  22     |  1.13     Ca    8.6        05 Mar 2019 01:27  Mg     2.0       05 Mar 2019 01:27    TPro  7.1    /  Alb  3.7    /  TBili  0.4    /  DBili  x      /  AST  38     /  ALT  35     /  AlkPhos  61     05 Mar 2019 01:27    PT/INR - ( 05 Mar 2019 01:27 )   PT: 13.6 sec;   INR: 1.24 ratio         PTT - ( 05 Mar 2019 01:27 )  PTT:29.8 sec  CARDIAC MARKERS ( 05 Mar 2019 20:48 )  .495 ng/mL / x     / x     / x     / x      CARDIAC MARKERS ( 05 Mar 2019 12:11 )  .509 ng/mL / x     / x     / x     / x      CARDIAC MARKERS ( 05 Mar 2019 01:27 )  .033 ng/mL / x     / x     / x     / x          - @ 01:27  Pro Bnp 417    RADIOLOGY:  < from: Xray Chest 1 View- PORTABLE-Urgent (19 @ 01:07) >  INTERPRETATION:  Clinical information: Shortness of breath.    Technique: Frontal view of the chest.    Comparison: Prior chest x-ray examination from 2014.    Findings: The lungs are clear. The cardiomediastinal silhouette is   normal. There are mild multilevel degenerative changes of the thoracic   spine.    IMPRESSION: Clear lungs, unchanged.    < end of copied text >    EKG:    < from: 12 Lead ECG (19 @ 00:53) >  Normal sinus rhythm    < end of copied text >

## 2019-03-07 VITALS
OXYGEN SATURATION: 97 % | TEMPERATURE: 98 F | SYSTOLIC BLOOD PRESSURE: 107 MMHG | RESPIRATION RATE: 16 BRPM | HEART RATE: 77 BPM | DIASTOLIC BLOOD PRESSURE: 68 MMHG

## 2019-03-07 DIAGNOSIS — R06.09 OTHER FORMS OF DYSPNEA: ICD-10-CM

## 2019-03-07 PROCEDURE — 83880 ASSAY OF NATRIURETIC PEPTIDE: CPT

## 2019-03-07 PROCEDURE — 85730 THROMBOPLASTIN TIME PARTIAL: CPT

## 2019-03-07 PROCEDURE — 99285 EMERGENCY DEPT VISIT HI MDM: CPT | Mod: 25

## 2019-03-07 PROCEDURE — 85610 PROTHROMBIN TIME: CPT

## 2019-03-07 PROCEDURE — 81001 URINALYSIS AUTO W/SCOPE: CPT

## 2019-03-07 PROCEDURE — 80053 COMPREHEN METABOLIC PANEL: CPT

## 2019-03-07 PROCEDURE — 97110 THERAPEUTIC EXERCISES: CPT

## 2019-03-07 PROCEDURE — 71275 CT ANGIOGRAPHY CHEST: CPT

## 2019-03-07 PROCEDURE — 80061 LIPID PANEL: CPT

## 2019-03-07 PROCEDURE — 97161 PT EVAL LOW COMPLEX 20 MIN: CPT

## 2019-03-07 PROCEDURE — 73562 X-RAY EXAM OF KNEE 3: CPT

## 2019-03-07 PROCEDURE — 82607 VITAMIN B-12: CPT

## 2019-03-07 PROCEDURE — 84484 ASSAY OF TROPONIN QUANT: CPT

## 2019-03-07 PROCEDURE — 94660 CPAP INITIATION&MGMT: CPT

## 2019-03-07 PROCEDURE — 83605 ASSAY OF LACTIC ACID: CPT

## 2019-03-07 PROCEDURE — 85379 FIBRIN DEGRADATION QUANT: CPT

## 2019-03-07 PROCEDURE — 84443 ASSAY THYROID STIM HORMONE: CPT

## 2019-03-07 PROCEDURE — 73521 X-RAY EXAM HIPS BI 2 VIEWS: CPT

## 2019-03-07 PROCEDURE — 85027 COMPLETE CBC AUTOMATED: CPT

## 2019-03-07 PROCEDURE — 71045 X-RAY EXAM CHEST 1 VIEW: CPT

## 2019-03-07 PROCEDURE — 83735 ASSAY OF MAGNESIUM: CPT

## 2019-03-07 PROCEDURE — 36415 COLL VENOUS BLD VENIPUNCTURE: CPT

## 2019-03-07 PROCEDURE — 84439 ASSAY OF FREE THYROXINE: CPT

## 2019-03-07 PROCEDURE — 93005 ELECTROCARDIOGRAM TRACING: CPT

## 2019-03-07 PROCEDURE — 99232 SBSQ HOSP IP/OBS MODERATE 35: CPT

## 2019-03-07 PROCEDURE — 80048 BASIC METABOLIC PNL TOTAL CA: CPT

## 2019-03-07 PROCEDURE — 97116 GAIT TRAINING THERAPY: CPT

## 2019-03-07 PROCEDURE — 87631 RESP VIRUS 3-5 TARGETS: CPT

## 2019-03-07 PROCEDURE — 87040 BLOOD CULTURE FOR BACTERIA: CPT

## 2019-03-07 RX ORDER — POLYETHYLENE GLYCOL 3350 17 G/17G
17 POWDER, FOR SOLUTION ORAL
Qty: 0 | Refills: 0 | DISCHARGE
Start: 2019-03-07

## 2019-03-07 RX ORDER — AMLODIPINE BESYLATE 2.5 MG/1
1 TABLET ORAL
Qty: 0 | Refills: 0 | COMMUNITY

## 2019-03-07 RX ADMIN — ENOXAPARIN SODIUM 40 MILLIGRAM(S): 100 INJECTION SUBCUTANEOUS at 11:39

## 2019-03-07 RX ADMIN — Medication 60 MILLIGRAM(S): at 06:19

## 2019-03-07 RX ADMIN — Medication 75 MILLIGRAM(S): at 00:20

## 2019-03-07 RX ADMIN — Medication 200 MILLIGRAM(S): at 11:39

## 2019-03-07 RX ADMIN — Medication 75 MILLIGRAM(S): at 11:39

## 2019-03-07 RX ADMIN — LOSARTAN POTASSIUM 100 MILLIGRAM(S): 100 TABLET, FILM COATED ORAL at 06:19

## 2019-03-07 RX ADMIN — Medication 975 MILLIGRAM(S): at 09:30

## 2019-03-07 RX ADMIN — CLOPIDOGREL BISULFATE 75 MILLIGRAM(S): 75 TABLET, FILM COATED ORAL at 11:39

## 2019-03-07 RX ADMIN — Medication 975 MILLIGRAM(S): at 10:37

## 2019-03-07 RX ADMIN — Medication 200 MILLIGRAM(S): at 06:24

## 2019-03-07 RX ADMIN — PANTOPRAZOLE SODIUM 40 MILLIGRAM(S): 20 TABLET, DELAYED RELEASE ORAL at 06:19

## 2019-03-07 RX ADMIN — Medication 25 MICROGRAM(S): at 06:19

## 2019-03-07 RX ADMIN — Medication 81 MILLIGRAM(S): at 11:39

## 2019-03-07 RX ADMIN — AMLODIPINE BESYLATE 5 MILLIGRAM(S): 2.5 TABLET ORAL at 06:19

## 2019-03-07 RX ADMIN — POLYETHYLENE GLYCOL 3350 17 GRAM(S): 17 POWDER, FOR SOLUTION ORAL at 11:39

## 2019-03-07 NOTE — PROGRESS NOTE ADULT - SUBJECTIVE AND OBJECTIVE BOX
med    states he fels stronger  able to walk safely  new or increaseds nasality congestion and dry cough  no fevers  no ha cp orthopnea pnd    k+ low -replaced   troponin improved   ekg unchanged   vss afebrile 99.1 max  102-138 bp  a+ox3  clear   pink    lungs clear   does 2000+ in incentive  heart reg  abd soft  no piting edema    imp  influenza a  improving  cad htn oa wt samantha  hypokalemia improved  troponins believed 2nd to flu/demand  subclinical hypothyroid    plan for home today  outpatinet pt  increase synthroid      dr cotter

## 2019-03-07 NOTE — PROGRESS NOTE ADULT - PROBLEM SELECTOR PLAN 2
recent PCI 1/2019 , repeat cath after that patent stents ,  continue antiplatelet drugs , statin ,  medication

## 2019-03-07 NOTE — PROGRESS NOTE ADULT - PROBLEM SELECTOR PLAN 4
prior to flu , for which he had repeat cath with patent stents , his Pro bnp is minimally elevated , his symptoms may be due to his increased consciousness about his breathing with exertion ? anxiety , his blood pressure controlled ,  currently he is feeling fine on routine activity , advised the patient to follow up with his cardiologist after discharge

## 2019-03-07 NOTE — PROGRESS NOTE ADULT - SUBJECTIVE AND OBJECTIVE BOX
HPI:  73 male from home  brought by ems as 2nd fall as legs weeak and ongoing uri with high fever + flu vaccine and now + flu.  Reports well until 2 days ago with mild uri sx that progressed mostly with weakness, +dry cough +low fever. Cardiology consulted due to elevated tropinin. Denies Denies chest pain, orthopnea, paroxysmal nocturnal dyspnea, dizziness, lightheadedness, claudication, pre-syncope or syncope. Reports recently had + stress test and had CC at [place].  Stents placed.  Still had more SOB after and had to go back for repeat procedure and reports results were good.  Never had angina.  has RICHMOND at baseline and this is no different now. Primary cardiologist (Dr. Mat Weston)      3/7/19  Patient complain his cough is bothering him , denies any cp or sob on routine activity ,     PAST MEDICAL & SURGICAL HISTORY:  Stented coronary artery  Gastroesophageal reflux disease without esophagitis: controlled on omeprazole  Wrist pain, right  Obesity  Hernia of abdominal wall  Migraine  NISHI on CPAP: unsure setting  Heart murmur: as child  Tachycardia: 12 years ago  Hypothyroid  Dyslipidemia  Hypertension  Osteoarthritis  S/P knee replacement: left 4/2014  S/P colonoscopy: 2 years ago, negative  S/P endoscopy: April 2015, mild gastritis  S/P tonsillectomy and adenoidectomy: as child  S/P total hip arthroplasty: left 2013  S/P total hip arthroplasty: right 2012  S/P total knee arthroplasty, right: 2011  S/P carpal tunnel release: left with ulnar nerve transposition 2009  S/P carpal tunnel release: right with ulnar nerve transposition 2009  S/P colon resection: with temporary colostomy for benign mass 1993  CAD    MEDICATIONS  (STANDING):  amLODIPine   Tablet 5 milliGRAM(s) Oral daily  aspirin  chewable 81 milliGRAM(s) Oral daily  clopidogrel Tablet 75 milliGRAM(s) Oral daily  enoxaparin Injectable 40 milliGRAM(s) SubCutaneous daily  levothyroxine 25 MICROGram(s) Oral daily  losartan 100 milliGRAM(s) Oral daily  oseltamivir 75 milliGRAM(s) Oral two times a day  pantoprazole    Tablet 40 milliGRAM(s) Oral before breakfast  polyethylene glycol 3350 17 Gram(s) Oral daily  propranolol LA 60 milliGRAM(s) Oral daily  simvastatin 40 milliGRAM(s) Oral at bedtime  tamsulosin 0.4 milliGRAM(s) Oral at bedtime    MEDICATIONS  (PRN):  acetaminophen   Tablet .. 975 milliGRAM(s) Oral every 8 hours PRN Temp greater or equal to 38C (100.4F), Moderate Pain (4 - 6)  guaiFENesin    Syrup 200 milliGRAM(s) Oral every 6 hours PRN Cough      REVIEW OF SYSTEMS: 13 systems were reviewed and all negative except for comments above.    Vital Signs Last 24 Hrs  T(C): 36.9 (07 Mar 2019 05:11), Max: 37.3 (06 Mar 2019 21:40)  T(F): 98.4 (07 Mar 2019 05:11), Max: 99.1 (06 Mar 2019 21:40)  HR: 90 (07 Mar 2019 05:11) (66 - 90)  BP: 138/84 (07 Mar 2019 05:11) (102/68 - 138/84)  BP(mean): --  RR: 18 (07 Mar 2019 05:11) (16 - 18)  SpO2: 92% (07 Mar 2019 05:11) (92% - 99%)    I&O's Summary        PHYSICAL EXAM:  Constitutional: NAD, awake and alert, well-developed  HEENT: PERRLA, EOMI,  No oral cyanosis.   Neck:  supple,  No JVD, No Thyroid enlargement. No Carotid Bruits bilaterally.  Respiratory: Breath sounds are clear bilaterally, No wheezing, rales or rhonchi  Cardiovascular: NL S1 and S2, RRR, 1/6 SANDRA to LLSB  Gastrointestinal: Bowel Sounds present, soft  Extremities: No peripheral edema. No clubbing or cyanosis.    Vascular: 2+ peripheral pulses in LE   Neurological: A/O x 3, no focal motor deficits  Musculoskeletal: no calf tenderness .  Skin: No rashes.      LABS: All Labs Reviewed:                           11.1   3.35  )-----------( 135      ( 06 Mar 2019 08:14 )             32.6     03-06    138  |  104  |  12  ----------------------------<  114<H>  3.7   |  25  |  0.90    Ca    8.0<L>      06 Mar 2019 20:48    TPro  6.0  /  Alb  3.1<L>  /  TBili  0.2  /  DBili  x   /  AST  51<H>  /  ALT  34  /  AlkPhos  52  03-06    CARDIAC MARKERS ( 06 Mar 2019 08:14 )  .210 ng/mL / x     / x     / x     / x      CARDIAC MARKERS ( 05 Mar 2019 20:48 )  .495 ng/mL / x     / x     / x     / x      CARDIAC MARKERS ( 05 Mar 2019 12:11 )  .509 ng/mL / x     / x     / x     / x          LIVER FUNCTIONS - ( 06 Mar 2019 08:14 )  Alb: 3.1 g/dL / Pro: 6.0 g/dL / ALK PHOS: 52 U/L / ALT: 34 U/L DA / AST: 51 U/L / GGT: x               03-06 Chol 118 LDL 56 HDL 37<L> Trig 124  RADIOLOGY:  < from: Xray Chest 1 View- PORTABLE-Urgent (03.05.19 @ 01:07) >  INTERPRETATION:  Clinical information: Shortness of breath.    Technique: Frontal view of the chest.    Comparison: Prior chest x-ray examination from 4/4/2014.    Findings: The lungs are clear. The cardiomediastinal silhouette is   normal. There are mild multilevel degenerative changes of the thoracic   spine.    IMPRESSION: Clear lungs, unchanged.    < end of copied text >    EKG:    < from: 12 Lead ECG (03.05.19 @ 00:53) >  Normal sinus rhythm      < from: 12 Lead ECG (03.06.19 @ 07:55) >  Sinus rhythm with 1st degree AV block  Otherwise normal ECG  When compared with ECG of 05-MAR-2019 00:53,  No significant change was found  Confirmed by Yoly GONZALEZ, Brett ([id number]) on 3/6/2019 8:58:26 AM    < end of copied text >      Monitor  SR

## 2019-03-07 NOTE — PROGRESS NOTE ADULT - PROBLEM SELECTOR PLAN 3
possibly due to demand ischemia associated with acute febrile illness , influenza ,bronchitis ,   trending down , recent cath patent stents ,   continue antiplatelet agents

## 2019-03-10 LAB
CULTURE RESULTS: SIGNIFICANT CHANGE UP
CULTURE RESULTS: SIGNIFICANT CHANGE UP
SPECIMEN SOURCE: SIGNIFICANT CHANGE UP
SPECIMEN SOURCE: SIGNIFICANT CHANGE UP

## 2019-04-09 ENCOUNTER — APPOINTMENT (OUTPATIENT)
Dept: ORTHOPEDIC SURGERY | Facility: CLINIC | Age: 74
End: 2019-04-09
Payer: MEDICARE

## 2019-04-09 VITALS — BODY MASS INDEX: 33.64 KG/M2 | WEIGHT: 235 LBS | HEIGHT: 70 IN

## 2019-04-09 DIAGNOSIS — M16.0 BILATERAL PRIMARY OSTEOARTHRITIS OF HIP: ICD-10-CM

## 2019-04-09 DIAGNOSIS — M17.0 BILATERAL PRIMARY OSTEOARTHRITIS OF KNEE: ICD-10-CM

## 2019-04-09 PROCEDURE — 73521 X-RAY EXAM HIPS BI 2 VIEWS: CPT

## 2019-04-09 PROCEDURE — 73560 X-RAY EXAM OF KNEE 1 OR 2: CPT | Mod: 50

## 2019-04-09 PROCEDURE — 99214 OFFICE O/P EST MOD 30 MIN: CPT

## 2019-04-10 PROBLEM — M17.0 PRIMARY OSTEOARTHRITIS OF BOTH KNEES: Status: ACTIVE | Noted: 2018-11-28

## 2019-04-10 PROBLEM — M16.0 PRIMARY OSTEOARTHRITIS OF BOTH HIPS: Status: ACTIVE | Noted: 2019-04-10

## 2019-04-10 NOTE — PHYSICAL EXAM
[Normal Touch] : sensation intact for touch [Normal] : No swelling, no edema, normal pedal pulses and normal temperature [Obese] : obese [Poor Appearance] : well-appearing [Acute Distress] : not in acute distress [de-identified] : Right Lower Extremity\par o Hip : non tender to palpation, mild pain with internal rotation, surgical scar well healed\par o Knee :\par ¦ Inspection/Palpation : no tenderness to palpation, no swelling, no deformity, surgical scar well appearing  \par ¦ Range of Motion : 0 - 120 degrees, no crepitus\par ¦ Stability : no valgus or varus instability present on provocative testing, Lachman’s Test (-)\par ¦ Strength : flexion and extension 3+/5\par ¦ Tests and Signs: Medial knee pain with rotation of the hip. \par o Muscle Bulk : normal muscle bulk present\par o Skin : no erythema, no ecchymosis \par o Sensation : sensation to pin intact\par o Vascular Exam : no edema, no cyanosis, dorsalis pedis artery pulse 2+, posterior tibial artery pulse 2+\par \par Left Lower Extremity\par \par o Hip : non tender to palpation, full ROM without pain, surgical scar well healed\par o Knee :\par ¦ Inspection/Palpation : no tenderness to palpation, no swelling, no deformity\par ¦ Range of Motion : 0 -125 degrees, no crepitus\par ¦ Stability : no valgus or varus instability present on provocative testing, Lachman’s Test (-)\par ¦ Strength : flexion and extension 3/5\par o Muscle Bulk : normal muscle bulk present\par o Skin : no erythema, no ecchymosis \par o Sensation : sensation to pin intact\par o Vascular Exam : no edema, no cyanosis, dorsalis pedis artery pulse 2+, posterior tibial artery pulse 2+  [de-identified] : o Right Hip and pelvis : AP and lateral views were obtained, s/p PAVEL with prosthesis in proper alignment with no signs of loosening, there are no soft tissue abnormalities, no fractures, no bony lesions, moderate heterotopic ossification around the hip.\par \par o Left Hip and pelvis : AP and lateral views were obtained, s/p PAVEL with prosthesis in proper alignment with no signs of loosening, there are no soft tissue abnormalities, no fractures, mild heterotopic ossification around the hip.\par \par o Right Knee : AP and lateral views of the knee were obtained, s/p TKA with prosthesis in proper alignment with no signs of loosening, there are no soft tissue abnormalities, no fractures, normal bone density, calcifications in the distal quadriceps tendon.\par \par o Left Knee : AP and lateral  views of the knee were obtained, s/p TKA with prosthesis in proper alignment with no signs of loosening, there are no soft tissue abnormalities, no fractures, alignment is normal, normal appearing joint spaces, normal bone density, no bony lesions. \par

## 2019-04-10 NOTE — ADDENDUM
[FreeTextEntry1] : I, Denise Tracy, acted solely as a scribe for Dr. Thad Dixon on this date 04/09/2019.

## 2019-04-10 NOTE — END OF VISIT
[FreeTextEntry3] : All medical record entries made by the Wilfredoibmelanie were at my, Dr. Thad Dixon, direction and personally dictated by me on 04/09/2019. I have reviewed the chart and agree that the record accurately reflects my personal performance of the history, physical exam, assessment and plan. I have also personally directed, reviewed, and agreed with the chart.

## 2019-04-10 NOTE — HISTORY OF PRESENT ILLNESS
[de-identified] : 73 year old male presents for an evaluation of bilateral knee pain, s/p bilateral PAVEL and bilateral TKA. He was being followed by Dr. Cardoza and he has been doing well with physical therapy until recently. He is currently experiencing a moderate aching discomfort about his knees that is exacerbated with walking, bending, and climbing stairs. He notes that he fell when his knee gave out on him and he sustained a direct impact to his right knee; he thinks this might be to attribute to his knee pain. Of note, patient has cardiac stents and was taking Celebrex until his pharmacist told him to consult his cardiologist before continuing with the medication.

## 2019-04-10 NOTE — DISCUSSION/SUMMARY
[de-identified] : The underlying pathophysiology was reviewed in great detail with the patient as well as the various treatment options, including ice, analgesics, NSAIDs, Physical therapy, steroid injections.\par \par He is to continue with physical therapy.\par \par I discussed the importance of weight loss to alleviate his knee pain. \par \par I have recommended utilizing a knee sleeves to provide added support and stability. \par \par He is to consult with his cardiologist about taking Celebrex.\par \par FU PRN.

## 2019-05-16 ENCOUNTER — APPOINTMENT (OUTPATIENT)
Dept: ORTHOPEDIC SURGERY | Facility: CLINIC | Age: 74
End: 2019-05-16
Payer: MEDICARE

## 2019-05-16 PROCEDURE — 20605 DRAIN/INJ JOINT/BURSA W/O US: CPT | Mod: 79,RT

## 2019-05-16 PROCEDURE — 73140 X-RAY EXAM OF FINGER(S): CPT | Mod: F3

## 2019-05-16 PROCEDURE — 99213 OFFICE O/P EST LOW 20 MIN: CPT | Mod: 25

## 2019-05-16 PROCEDURE — 20550 NJX 1 TENDON SHEATH/LIGAMENT: CPT | Mod: LT

## 2019-05-16 RX ORDER — TAMSULOSIN HYDROCHLORIDE 0.4 MG/1
0.4 CAPSULE ORAL
Refills: 0 | Status: ACTIVE | COMMUNITY

## 2019-05-16 RX ORDER — CLOPIDOGREL 75 MG/1
TABLET, FILM COATED ORAL
Refills: 0 | Status: ACTIVE | COMMUNITY

## 2019-06-06 ENCOUNTER — APPOINTMENT (OUTPATIENT)
Dept: ORTHOPEDIC SURGERY | Facility: CLINIC | Age: 74
End: 2019-06-06

## 2019-07-01 ENCOUNTER — APPOINTMENT (OUTPATIENT)
Dept: ORTHOPEDIC SURGERY | Facility: CLINIC | Age: 74
End: 2019-07-01
Payer: MEDICARE

## 2019-07-01 DIAGNOSIS — M25.521 PAIN IN RIGHT ELBOW: ICD-10-CM

## 2019-07-01 DIAGNOSIS — S50.01XA CONTUSION OF RIGHT ELBOW, INITIAL ENCOUNTER: ICD-10-CM

## 2019-07-01 PROCEDURE — 99213 OFFICE O/P EST LOW 20 MIN: CPT | Mod: 25

## 2019-07-01 PROCEDURE — 20550 NJX 1 TENDON SHEATH/LIGAMENT: CPT | Mod: 59,F2

## 2019-07-01 PROCEDURE — 73080 X-RAY EXAM OF ELBOW: CPT | Mod: RT

## 2019-07-02 PROBLEM — M25.521 ELBOW PAIN, RIGHT: Status: ACTIVE | Noted: 2019-07-01

## 2019-07-02 PROBLEM — S50.01XA CONTUSION OF RIGHT ELBOW, INITIAL ENCOUNTER: Status: ACTIVE | Noted: 2019-07-02

## 2019-07-02 NOTE — PHYSICAL EXAM
[de-identified] : Patient is well-nourished, well developed, alert and in no acute distress. Breathing is unlabored. He is grossly oriented to person, place and time. \par \par Right Elbow:\par   Inspection/Palpation: There is tenderness over the olecranon. No virgil, or deformities. No skin lesions or discoloration.\par   Range of Motion: Full extension and flexion. No crepitance. \par   Strength: Flexion and extension 5/5\par   Stability: No joint instability on provocative testing. \par \par Right hand: There is A1 pulley tenderness in the long finger, full arc of motion in the fingers, and all intrinsic and extrinsic hand muscles 5/5. No joint instability on provocative testing, sensation is intact to light touch, and no skin lesions or discoloration. \par \par Left hand: There is A1 pulley tenderness in the long full arc of motion in the fingers, and all intrinsic and extrinsic hand muscles 5/5. No joint instability on provocative testing, sensation is intact to light touch, and no skin lesions or discoloration.  [de-identified] : AP, lateral and oblique views of the right elbow were obtained today and revealed no acute fracture or dislocation. There is calcium deposition over the lateral epicondyle most likely due to recent injury.

## 2019-07-02 NOTE — ADDENDUM
[FreeTextEntry1] : I, Dunia Pimentel wrote this note acting as a scribe for Dr. Alfonso Gandara on Jul 01, 2019.

## 2019-07-02 NOTE — END OF VISIT
[FreeTextEntry3] : I, Alfonso Gandara MD, ordering physician, have read and attest that all the information, medical decision making and discharge instructions within are true and accurate.

## 2019-07-02 NOTE — HISTORY OF PRESENT ILLNESS
[Right] : right hand dominant [FreeTextEntry1] : Patient is a 73 year old male who presents c/o right elbow pain after injuring it last week in [place]. He states that he was vacationing there when he fell in his hotel lobby and landed onto the right elbow. The elbow became swollen several hours later but resolved by the following morning. It has been tender to the touch since then. \par He also states that the left long finger is again painful. There is tenderness over the MP joint. He was treated with a cortisone injection in this office on 05/16/2019. He states that the right long finger is now similarly symptomatic. He would like to have a cortisone injection for both fingers today. \par \par Of note, the patient underwent a right proximal row carpectomy on 10/30/15 at [facility]. He recently underwent angioplasty with 2 coronary stents for which he is on Xarelto. He is also undergoing lumbar laminectomy by Dr. Swan for 4 herniated discs next year.

## 2019-07-02 NOTE — DISCUSSION/SUMMARY
[FreeTextEntry1] : The patient wishes to proceed with a cortisone injection at this time. The skin was prepped with alcohol and sprayed with Ethyl Chloride. An injection of 0.5 cc 1% Lidocaine without epinephrine, 0.25 cc Kenalog 40mg, and 0.25 cc Dexamethasone was administered into the flexor tendon sheath of the right and left long finger.\par \par Regarding the right elbow:\par Patient was advised to ice the area. \par NSAIDs as tolerated. \par He may return to this office for a cortisone injection if his symptoms persist or worsen. \par

## 2019-07-15 NOTE — HISTORY OF PRESENT ILLNESS
[Right] : right hand dominant [FreeTextEntry1] : He presents c/o left long finger pain x 2 months.  Denies triggering.  He has pain with extension of the finger. He also c/o right wrist pain. Patient underwent a right proximal row carpectomy on 10/30/15 at [facility]. Patient states he still has increasing pain with movement. He states he hasn't lost any range of motion of his wrist, but that he is limited due to pain. He denies any numbness or tingling. \par \par Of note, patient recently underwent angioplasty with 2 coronary stents. He is also undergoing lumbar laminectomy by Dr. Swan for 4 herniated discs next year.\par

## 2019-07-15 NOTE — ADDENDUM
[FreeTextEntry1] : I, Dunia Pimentel wrote this note acting as a scribe for Dr. Alfonso Gandara on May 16, 2019.

## 2019-07-15 NOTE — PHYSICAL EXAM
[de-identified] : Patient is well-nourished, well developed, alert and in no acute distress. Breathing is unlabored. He is grossly oriented to person, place and time. \par \par Left hand: There is A1 pulley tenderness in the ring finger, full arc of motion in the fingers, and all intrinsic and extrinsic hand muscles 5/5. No joint instability on provocative testing, sensation is intact to light touch, and no skin lesions or discoloration. \par \par Right Wrist: There is tenderness, no swelling or deformities. The ROM is full, but with pain and crepitus. There is no joint instability on provocative testing\par Strength : extension, flexion, ulnar deviation and radial deviation 5/5\par Tests/Signs : Tinel's sign negative over carpal tunnel, Phalen’s test negative \par \par  [de-identified] : AP, lateral and oblique views of the left hand were obtained today and revealed mild CMC osteoarthritis. No abnormalities in the ring finger.  contact guard

## 2019-07-15 NOTE — DISCUSSION/SUMMARY
[FreeTextEntry1] : The patient wishes to proceed with a cortisone injection at this time. The skin was prepped with alcohol and sprayed with Ethyl Chloride. An injection of 0.5 cc 1% Lidocaine without epinephrine, 0.25 cc Kenalog 40mg, and 0.25 cc Dexamethasone was administered into the flexor tendon sheath of the long ring finger. A second injection was given into the right dorsal radiocarpal joint space. The patient tolerated the procedure well. Apply ice. Follow up as needed.

## 2019-07-19 ENCOUNTER — APPOINTMENT (OUTPATIENT)
Dept: ORTHOPEDIC SURGERY | Facility: CLINIC | Age: 74
End: 2019-07-19
Payer: MEDICARE

## 2019-07-19 VITALS — BODY MASS INDEX: 33.64 KG/M2 | WEIGHT: 235 LBS | HEIGHT: 70 IN

## 2019-07-19 DIAGNOSIS — M51.26 OTHER INTERVERTEBRAL DISC DISPLACEMENT, LUMBAR REGION: ICD-10-CM

## 2019-07-19 PROCEDURE — 99213 OFFICE O/P EST LOW 20 MIN: CPT

## 2019-07-19 RX ORDER — OMEPRAZOLE 40 MG/1
40 CAPSULE, DELAYED RELEASE ORAL
Refills: 0 | Status: DISCONTINUED | COMMUNITY
End: 2019-07-19

## 2019-07-19 RX ORDER — TRIAMTERENE AND HYDROCHLOROTHIAZIDE 50; 75 MG/1; MG/1
TABLET ORAL
Refills: 0 | Status: ACTIVE | COMMUNITY

## 2019-07-19 RX ORDER — PANTOPRAZOLE 40 MG/1
TABLET, DELAYED RELEASE ORAL
Refills: 0 | Status: ACTIVE | COMMUNITY

## 2019-07-19 RX ORDER — HYDROCODONE BITARTRATE AND ACETAMINOPHEN 5; 300 MG/1; MG/1
TABLET ORAL
Refills: 0 | Status: ACTIVE | COMMUNITY

## 2019-07-24 NOTE — ED PROVIDER NOTE - DATE/TIME FOR CONVERSATION WITH PCP
- recommend follow-up with Ortho spine  - has knee arthrosis so cannot get MRI. Defer to Ortho spine for imaging   05-Mar-2019 06:14

## 2019-10-02 PROBLEM — Z60.2 PERSON LIVING ALONE: Status: ACTIVE | Noted: 2018-12-06

## 2019-10-29 ENCOUNTER — CLINICAL ADVICE (OUTPATIENT)
Age: 74
End: 2019-10-29

## 2019-11-19 ENCOUNTER — APPOINTMENT (OUTPATIENT)
Dept: ORTHOPEDIC SURGERY | Facility: CLINIC | Age: 74
End: 2019-11-19
Payer: MEDICARE

## 2019-11-19 PROCEDURE — 20550 NJX 1 TENDON SHEATH/LIGAMENT: CPT | Mod: 59,F3

## 2019-11-19 PROCEDURE — 20605 DRAIN/INJ JOINT/BURSA W/O US: CPT | Mod: RT

## 2019-11-19 PROCEDURE — 99213 OFFICE O/P EST LOW 20 MIN: CPT | Mod: 25

## 2019-11-21 NOTE — HISTORY OF PRESENT ILLNESS
[Right] : right hand dominant [FreeTextEntry1] : Patient is a 74 year old male who presents for a followup visit involving the left long finger pain.  Denies triggering.  He has pain with extension of the finger. There is tenderness over the palmar aspect of the MP joint. He was treated with a cortisone injection in this office on 05/16/2019. \par He also c/o right wrist pain. Patient underwent a right proximal row carpectomy on 10/30/15 at [facility]. Patient states he still has increasing pain with movement. He states he hasn't lost any range of motion of his wrist, but that he is limited due to pain. He denies any numbness or tingling. He would like a cortisone injection for both today. \par \par Of note, patient recently underwent angioplasty with 2 coronary stents. He is scheduled to undergo lumbar laminectomy without fusion by Dr. Swan for 4 herniated discs early in 2020.

## 2019-11-21 NOTE — PHYSICAL EXAM
[de-identified] : Patient is well-nourished, well developed, alert and in no acute distress. Breathing is unlabored. He is grossly oriented to person, place and time. \par \par Right Wrist: There is tenderness, no swelling or deformities. The ROM is full, but with pain and crepitus. There is no joint instability on provocative testing\par Strength : extension, flexion, ulnar deviation and radial deviation 5/5\par Tests/Signs : Tinel's sign negative over carpal tunnel, Phalen’s test negative \par \par Left hand: There is A1 pulley tenderness in the long finger, full arc of motion in the fingers, and all intrinsic and extrinsic hand muscles 5/5. No joint instability on provocative testing, sensation is intact to light touch, and no skin lesions or discoloration.  [de-identified] : No imaging done today.

## 2019-11-21 NOTE — ADDENDUM
[FreeTextEntry1] : I, Dunia Pimentel wrote this note acting as a scribe for Dr. Alfonso Gandara on Nov 19, 2019.

## 2019-11-21 NOTE — DISCUSSION/SUMMARY
[FreeTextEntry1] : The patient wishes to proceed with a 2nd cortisone injection at this time. The skin was prepped with alcohol and sprayed with Ethyl Chloride. An injection of 0.5 cc 1% Lidocaine without epinephrine, 0.25 cc Kenalog 40mg, and 0.25 cc Dexamethasone was administered into the flexor tendon sheath of the left long ring finger. A second injection was given into the right dorsal radiocarpal joint space. The patient tolerated the procedure well. Apply ice. \par \par The patient was advised to soak the hand in warm water and Epsom salt. \par Topical analgesics as needed. \par NSAIDs as tolerated. \par Follow up as needed.

## 2019-12-03 ENCOUNTER — FORM ENCOUNTER (OUTPATIENT)
Age: 74
End: 2019-12-03

## 2019-12-04 ENCOUNTER — APPOINTMENT (OUTPATIENT)
Dept: MRI IMAGING | Facility: CLINIC | Age: 74
End: 2019-12-04
Payer: MEDICARE

## 2019-12-04 ENCOUNTER — OUTPATIENT (OUTPATIENT)
Dept: OUTPATIENT SERVICES | Facility: HOSPITAL | Age: 74
LOS: 1 days | End: 2019-12-04
Payer: MEDICARE

## 2019-12-04 DIAGNOSIS — M54.16 RADICULOPATHY, LUMBAR REGION: ICD-10-CM

## 2019-12-04 DIAGNOSIS — Z96.60 PRESENCE OF UNSPECIFIED ORTHOPEDIC JOINT IMPLANT: Chronic | ICD-10-CM

## 2019-12-04 DIAGNOSIS — Z96.651 PRESENCE OF RIGHT ARTIFICIAL KNEE JOINT: Chronic | ICD-10-CM

## 2019-12-04 DIAGNOSIS — Z98.89 OTHER SPECIFIED POSTPROCEDURAL STATES: Chronic | ICD-10-CM

## 2019-12-04 DIAGNOSIS — Z96.659 PRESENCE OF UNSPECIFIED ARTIFICIAL KNEE JOINT: Chronic | ICD-10-CM

## 2019-12-04 DIAGNOSIS — M48.07 SPINAL STENOSIS, LUMBOSACRAL REGION: ICD-10-CM

## 2019-12-04 PROCEDURE — 72148 MRI LUMBAR SPINE W/O DYE: CPT

## 2019-12-04 PROCEDURE — 72148 MRI LUMBAR SPINE W/O DYE: CPT | Mod: 26

## 2019-12-20 ENCOUNTER — APPOINTMENT (OUTPATIENT)
Dept: ORTHOPEDIC SURGERY | Facility: CLINIC | Age: 74
End: 2019-12-20
Payer: MEDICARE

## 2019-12-20 VITALS — HEIGHT: 70 IN | WEIGHT: 235 LBS | BODY MASS INDEX: 33.64 KG/M2

## 2019-12-20 PROCEDURE — 72110 X-RAY EXAM L-2 SPINE 4/>VWS: CPT

## 2019-12-20 PROCEDURE — 99213 OFFICE O/P EST LOW 20 MIN: CPT

## 2020-01-14 ENCOUNTER — OUTPATIENT (OUTPATIENT)
Dept: OUTPATIENT SERVICES | Facility: HOSPITAL | Age: 75
LOS: 1 days | End: 2020-01-14
Payer: MEDICARE

## 2020-01-14 VITALS
TEMPERATURE: 98 F | HEIGHT: 69 IN | HEART RATE: 78 BPM | WEIGHT: 255.07 LBS | RESPIRATION RATE: 16 BRPM | SYSTOLIC BLOOD PRESSURE: 146 MMHG | DIASTOLIC BLOOD PRESSURE: 80 MMHG | OXYGEN SATURATION: 97 %

## 2020-01-14 DIAGNOSIS — Z98.89 OTHER SPECIFIED POSTPROCEDURAL STATES: Chronic | ICD-10-CM

## 2020-01-14 DIAGNOSIS — Z01.818 ENCOUNTER FOR OTHER PREPROCEDURAL EXAMINATION: ICD-10-CM

## 2020-01-14 DIAGNOSIS — Z96.60 PRESENCE OF UNSPECIFIED ORTHOPEDIC JOINT IMPLANT: Chronic | ICD-10-CM

## 2020-01-14 DIAGNOSIS — G47.33 OBSTRUCTIVE SLEEP APNEA (ADULT) (PEDIATRIC): ICD-10-CM

## 2020-01-14 DIAGNOSIS — M48.07 SPINAL STENOSIS, LUMBOSACRAL REGION: ICD-10-CM

## 2020-01-14 DIAGNOSIS — M51.26 OTHER INTERVERTEBRAL DISC DISPLACEMENT, LUMBAR REGION: ICD-10-CM

## 2020-01-14 DIAGNOSIS — Z96.659 PRESENCE OF UNSPECIFIED ARTIFICIAL KNEE JOINT: Chronic | ICD-10-CM

## 2020-01-14 DIAGNOSIS — I25.10 ATHEROSCLEROTIC HEART DISEASE OF NATIVE CORONARY ARTERY WITHOUT ANGINA PECTORIS: ICD-10-CM

## 2020-01-14 DIAGNOSIS — M54.16 RADICULOPATHY, LUMBAR REGION: ICD-10-CM

## 2020-01-14 DIAGNOSIS — Z96.651 PRESENCE OF RIGHT ARTIFICIAL KNEE JOINT: Chronic | ICD-10-CM

## 2020-01-14 DIAGNOSIS — M48.061 SPINAL STENOSIS, LUMBAR REGION WITHOUT NEUROGENIC CLAUDICATION: ICD-10-CM

## 2020-01-14 PROCEDURE — 87640 STAPH A DNA AMP PROBE: CPT

## 2020-01-14 PROCEDURE — 87641 MR-STAPH DNA AMP PROBE: CPT

## 2020-01-14 PROCEDURE — G0463: CPT

## 2020-01-14 RX ORDER — LIDOCAINE HCL 20 MG/ML
0.2 VIAL (ML) INJECTION ONCE
Refills: 0 | Status: DISCONTINUED | OUTPATIENT
Start: 2020-01-28 | End: 2020-01-30

## 2020-01-14 RX ORDER — LEVOTHYROXINE SODIUM 125 MCG
2 TABLET ORAL
Qty: 0 | Refills: 0 | DISCHARGE

## 2020-01-14 RX ORDER — CLOPIDOGREL BISULFATE 75 MG/1
1 TABLET, FILM COATED ORAL
Qty: 0 | Refills: 0 | DISCHARGE

## 2020-01-14 RX ORDER — CEFAZOLIN SODIUM 1 G
2000 VIAL (EA) INJECTION ONCE
Refills: 0 | Status: DISCONTINUED | OUTPATIENT
Start: 2020-01-28 | End: 2020-01-30

## 2020-01-14 NOTE — H&P PST ADULT - ASSESSMENT
DIETERI VTE 2.0 SCORE [CLOT updated 2019]    AGE RELATED RISK FACTORS                                                       MOBILITY RELATED FACTORS  [ ] Age 41-60 years                                            (1 Point)                    [ ] Bed rest                                                        (1 Point)  [ ] Age: 61-74 years                                           (2 Points)                  [ ] Plaster cast                                                   (2 Points)  [x ] Age= 75 years                                              (3 Points)                    [ ] Bed bound for more than 72 hours                 (2 Points)    DISEASE RELATED RISK FACTORS                                               GENDER SPECIFIC FACTORS  [ ] Edema in the lower extremities                       (1 Point)              [ ] Pregnancy                                                     (1 Point)  [ ] Varicose veins                                               (1 Point)                     [ ] Post-partum < 6 weeks                                   (1 Point)             [ x] BMI > 25 Kg/m2                                            (1 Point)                     [ ] Hormonal therapy  or oral contraception          (1 Point)                 [ ] Sepsis (in the previous month)                        (1 Point)               [ ] History of pregnancy complications                 (1 point)  [ ] Pneumonia or serious lung disease                                               [ ] Unexplained or recurrent                     (1 Point)           (in the previous month)                               (1 Point)  [ ] Abnormal pulmonary function test                     (1 Point)                 SURGERY RELATED RISK FACTORS  [ ] Acute myocardial infarction                              (1 Point)               [ ]  Section                                             (1 Point)  [ ] Congestive heart failure (in the previous month)  (1 Point)      [ ] Minor surgery                                                  (1 Point)   [ ] Inflammatory bowel disease                             (1 Point)               [ ] Arthroscopic surgery                                        (2 Points)  [ ] Central venous access                                      (2 Points)                [x ] General surgery lasting more than 45 minutes (2 points)  [ ] Malignancy- Present or previous                   (2 Points)                [ ] Elective arthroplasty                                         (5 points)    [ ] Stroke (in the previous month)                          (5 Points)                                                                                                                                                           HEMATOLOGY RELATED FACTORS                                                 TRAUMA RELATED RISK FACTORS  [ ] Prior episodes of VTE                                     (3 Points)                [ ] Fracture of the hip, pelvis, or leg                       (5 Points)  [ ] Positive family history for VTE                         (3 Points)             [ ] Acute spinal cord injury (in the previous month)  (5 Points)  [ ] Prothrombin 96151 A                                     (3 Points)               [ ] Paralysis  (less than 1 month)                             (5 Points)  [ ] Factor V Leiden                                             (3 Points)                  [ ] Multiple Trauma within 1 month                        (5 Points)  [ ] Lupus anticoagulants                                     (3 Points)                                                           [ ] Anticardiolipin antibodies                               (3 Points)                                                       [ ] High homocysteine in the blood                      (3 Points)                                             [ ] Other congenital or acquired thrombophilia      (3 Points)                                                [ ] Heparin induced thrombocytopenia                  (3 Points)                                     Total Score [      6    ]

## 2020-01-14 NOTE — H&P PST ADULT - HISTORY OF PRESENT ILLNESS
71 yo male presents with c/o right wrist pain since last December. Received cortisone injection in August 2015 with good relief. Since cortisone injection c/o 1/10 pain at rest and 2-3/10 pain with use of hand. Denies any obvious injury. Denies use of pain relief measures. Wore a splint in the past with no relief. 74 yr old male with h/o HTN, HLD, hypothyroidism, GERD, Migraine HA, CAD with 2 stents ( 2018) on ASA, BPH, OA, bilateral hips and knees replacement in the past, obese , BMI 37, lumbar spine stenosis, lumbar radiculopathy, presents to PST for scheduled L3-S1 posterior lumbar laminectomies and discectomy on 1/28/20. Patient reports slight cold symptoms, nasal congestion, sore throat, has appointment with ENT today to follow up. Denies fever, chills, no acute complaint. Ambulating without assistance. 74 yr old male with h/o HTN, HLD, hypothyroidism, GERD, Migraine HA, CAD with 2 stents ( 2018) on ASA, BPH, OA, bilateral hips and knees replacement in the past, obese , BMI 37, NISHI on CPAP,  lumbar spine stenosis, lumbar radiculopathy, c/o numbness and at time weakness of lower extremities with walking, back pain , right leg pain . Patient  presents to PST for scheduled L3-S1 posterior lumbar laminectomies and discectomy on 1/28/20. Patient reports slight cold symptoms, nasal congestion, sore throat, has appointment with ENT today to follow up. Denies fever, chills, no acute complaint. Ambulating without assistance. 74 yr old male with h/o HTN, HLD, hypothyroidism, GERD, Migraine HA, CAD with 2 stents ( 2018) on ASA, BPH, OA, bilateral hips and knees replacement in the past, obese , BMI 37, NISHI on CPAP,  lumbar spine stenosis, lumbar radiculopathy, c/o numbness and at time weakness of lower extremities with walking, back pain , right leg pain . Patient  presents to PST for scheduled L3-S1 posterior lumbar laminectomies and discectomy on 1/28/20. Patient reports slight cold symptoms, nasal congestion, sore throat, has appointment with ENT today to follow up, understand to inform the surgeon if sick . Denies fever, chills, no acute complaint. Ambulating without assistance.

## 2020-01-14 NOTE — H&P PST ADULT - NSICDXPASTMEDICALHX_GEN_ALL_CORE_FT
PAST MEDICAL HISTORY:  Dyslipidemia     Gastroesophageal reflux disease without esophagitis controlled on omeprazole    Heart murmur as child    Hernia of abdominal wall     Hypertension     Hypothyroid     Migraine     Obesity     NISHI on CPAP unsure setting    Osteoarthritis     Stented coronary artery     Wrist pain, right h/o surgery PAST MEDICAL HISTORY:  Dyslipidemia     Gastroesophageal reflux disease without esophagitis controlled on omeprazole    Heart murmur as child    Hernia of abdominal wall h/o colon resection    Hypertension     Hypothyroid     Lumbar radiculopathy     Lumbar spinal stenosis     Migraine chronic    Obesity BMI 37    NISHI on CPAP     Osteoarthritis     Stented coronary artery x 2 2018    Wrist pain, right h/o surgery

## 2020-01-14 NOTE — H&P PST ADULT - NSICDXPROBLEM_GEN_ALL_CORE_FT
PROBLEM DIAGNOSES  Problem: Lumbar spinal stenosis  Assessment and Plan: L3-S1 posterior lumbar laminectomies & discectomy   PST instructions provided, surgical scrub given, questions answered, patient verbalized understanding.   Blood work done by PCP yesterday 1/13/20, will obtain result and PCP eval   MRSA collected and sent       Problem: CAD (coronary artery disease)  Assessment and Plan: Will continue ASA  no medication changes     Problem: NISHI on CPAP  Assessment and Plan: NISHI precausion   OR booking notified PROBLEM DIAGNOSES  Problem: Lumbar spinal stenosis  Assessment and Plan: L3-S1 posterior lumbar laminectomies & discectomy   PST instructions provided, surgical scrub given, questions answered, patient verbalized understanding.   Blood work done by PCP yesterday 1/13/20, will obtain result and PCP eval   MRSA collected and sent       Problem: CAD (coronary artery disease)  Assessment and Plan: Will continue ASA  no medication changes     Problem: NISHI on CPAP  Assessment and Plan: NISHI precautions   OR booking notified PROBLEM DIAGNOSES  Problem: Lumbar spinal stenosis  Assessment and Plan: L3-S1 posterior lumbar laminectomies & discectomy   PST instructions provided, surgical scrub given, questions answered, patient verbalized understanding.   Blood work done by PCP yesterday 1/13/20, will obtain result and PCP eval   MRSA collected and sent       Problem: CAD (coronary artery disease)  Assessment and Plan: Will continue ASA, ok by surgeon   no medication changes     Problem: NISHI on CPAP  Assessment and Plan: NISHI precautions   OR booking notified

## 2020-01-14 NOTE — H&P PST ADULT - NSICDXPASTSURGICALHX_GEN_ALL_CORE_FT
PAST SURGICAL HISTORY:  S/P carpal tunnel release right with ulnar nerve transposition 2009    S/P carpal tunnel release left with ulnar nerve transposition 2009    S/P colon resection with temporary colostomy for benign mass 1993    S/P colonoscopy 12/4/2019 small bening polyp    S/P endoscopy Spring  2019    S/P knee replacement left 4/2014 , right 2015    S/P tonsillectomy and adenoidectomy as child    S/P total hip arthroplasty right 2012    S/P total hip arthroplasty left 2013    S/P total knee arthroplasty, right 2011 PAST SURGICAL HISTORY:  S/P carpal tunnel release right with ulnar nerve transposition 2009    S/P carpal tunnel release left with ulnar nerve transposition 2009    S/P colon resection with temporary colostomy for benign mass 1993    S/P colonoscopy 12/4/2019 small benign  polyp    S/P endoscopy Spring  2019    S/P knee replacement left 4/2014 , right 2015    S/P tonsillectomy and adenoidectomy as child    S/P total hip arthroplasty right 2012    S/P total hip arthroplasty left 2013    S/P total knee arthroplasty, right 2011

## 2020-01-14 NOTE — H&P PST ADULT - NSICDXFAMILYHX_GEN_ALL_CORE_FT
FAMILY HISTORY:  Family history of breast cancer, with mets  Family history of prostate cancer  Family history of type 2 diabetes mellitus    Sibling  Still living? Yes, Estimated age:   Family history of dementia, Age at diagnosis: Age Unknown  Family history of hypertension, Age at diagnosis: Age Unknown

## 2020-01-14 NOTE — H&P PST ADULT - ATTENDING COMMENTS
I have discussed at length with the patient the indications for the planned surgery of laminectomy/decompression for spinal stenosis, the other options available, the nature of the surgery and the risks and possible complications including but not limited to death, paralysis, nerve damage, infection, failure of the surgery to relieve symptoms, possibility of worsening sx after surgery, possible need for further surgery in the future such as spinal fusion,  adjacent level deterioration, spinal fluid leakage, pulmonary/cardiac problems, blood clots, urinary tract infection, organ damage/failure, excessive bleeding requiring transfusions and related complications, etc, etc. All questions were answered, models and diagrams were used  and the patient understands and wishes to proceed- tm

## 2020-01-14 NOTE — H&P PST ADULT - OTHER CARE PROVIDERS
cardio Dr. Mat Weston [phone] has appointment on 1/15/20. ENT  Dr. Tay [phone] has appointment today cardio Dr. Mat Weston [phone] has appointment on 1/15/20. ENT  Dr. Tay [phone] has appointment today, pul Dr. Gabino Perdue

## 2020-01-15 LAB
MRSA PCR RESULT.: SIGNIFICANT CHANGE UP
S AUREUS DNA NOSE QL NAA+PROBE: DETECTED

## 2020-01-16 RX ORDER — MUPIROCIN 20 MG/G
2 OINTMENT TOPICAL
Qty: 1 | Refills: 0 | Status: ACTIVE | COMMUNITY
Start: 2020-01-16 | End: 1900-01-01

## 2020-01-27 ENCOUNTER — TRANSCRIPTION ENCOUNTER (OUTPATIENT)
Age: 75
End: 2020-01-27

## 2020-01-28 ENCOUNTER — INPATIENT (INPATIENT)
Facility: HOSPITAL | Age: 75
LOS: 1 days | Discharge: HOME | DRG: 520 | End: 2020-01-30
Attending: ORTHOPAEDIC SURGERY | Admitting: ORTHOPAEDIC SURGERY
Payer: MEDICARE

## 2020-01-28 ENCOUNTER — RESULT REVIEW (OUTPATIENT)
Age: 75
End: 2020-01-28

## 2020-01-28 ENCOUNTER — APPOINTMENT (OUTPATIENT)
Dept: ORTHOPEDIC SURGERY | Facility: HOSPITAL | Age: 75
End: 2020-01-28

## 2020-01-28 VITALS
RESPIRATION RATE: 16 BRPM | DIASTOLIC BLOOD PRESSURE: 91 MMHG | WEIGHT: 255.07 LBS | TEMPERATURE: 98 F | HEART RATE: 76 BPM | OXYGEN SATURATION: 96 % | SYSTOLIC BLOOD PRESSURE: 147 MMHG | HEIGHT: 69 IN

## 2020-01-28 DIAGNOSIS — M51.26 OTHER INTERVERTEBRAL DISC DISPLACEMENT, LUMBAR REGION: ICD-10-CM

## 2020-01-28 DIAGNOSIS — M54.16 RADICULOPATHY, LUMBAR REGION: ICD-10-CM

## 2020-01-28 DIAGNOSIS — Z98.89 OTHER SPECIFIED POSTPROCEDURAL STATES: Chronic | ICD-10-CM

## 2020-01-28 DIAGNOSIS — Z96.651 PRESENCE OF RIGHT ARTIFICIAL KNEE JOINT: Chronic | ICD-10-CM

## 2020-01-28 DIAGNOSIS — Z96.60 PRESENCE OF UNSPECIFIED ORTHOPEDIC JOINT IMPLANT: Chronic | ICD-10-CM

## 2020-01-28 DIAGNOSIS — Z96.659 PRESENCE OF UNSPECIFIED ARTIFICIAL KNEE JOINT: Chronic | ICD-10-CM

## 2020-01-28 LAB
ANION GAP SERPL CALC-SCNC: 15 MMOL/L — SIGNIFICANT CHANGE UP (ref 5–17)
BASOPHILS # BLD AUTO: 0.01 K/UL — SIGNIFICANT CHANGE UP (ref 0–0.2)
BASOPHILS NFR BLD AUTO: 0.1 % — SIGNIFICANT CHANGE UP (ref 0–2)
BLD GP AB SCN SERPL QL: NEGATIVE — SIGNIFICANT CHANGE UP
BUN SERPL-MCNC: 16 MG/DL — SIGNIFICANT CHANGE UP (ref 7–23)
CALCIUM SERPL-MCNC: 7.8 MG/DL — LOW (ref 8.4–10.5)
CHLORIDE SERPL-SCNC: 105 MMOL/L — SIGNIFICANT CHANGE UP (ref 96–108)
CO2 SERPL-SCNC: 19 MMOL/L — LOW (ref 22–31)
CREAT SERPL-MCNC: 0.84 MG/DL — SIGNIFICANT CHANGE UP (ref 0.5–1.3)
EOSINOPHIL # BLD AUTO: 0.02 K/UL — SIGNIFICANT CHANGE UP (ref 0–0.5)
EOSINOPHIL NFR BLD AUTO: 0.3 % — SIGNIFICANT CHANGE UP (ref 0–6)
GAS PNL BLDA: SIGNIFICANT CHANGE UP
GAS PNL BLDA: SIGNIFICANT CHANGE UP
GLUCOSE SERPL-MCNC: 143 MG/DL — HIGH (ref 70–99)
HCT VFR BLD CALC: 34.3 % — LOW (ref 39–50)
HGB BLD-MCNC: 11.4 G/DL — LOW (ref 13–17)
IMM GRANULOCYTES NFR BLD AUTO: 0.5 % — SIGNIFICANT CHANGE UP (ref 0–1.5)
LYMPHOCYTES # BLD AUTO: 0.85 K/UL — LOW (ref 1–3.3)
LYMPHOCYTES # BLD AUTO: 11.4 % — LOW (ref 13–44)
MCHC RBC-ENTMCNC: 30.7 PG — SIGNIFICANT CHANGE UP (ref 27–34)
MCHC RBC-ENTMCNC: 33.2 GM/DL — SIGNIFICANT CHANGE UP (ref 32–36)
MCV RBC AUTO: 92.5 FL — SIGNIFICANT CHANGE UP (ref 80–100)
MONOCYTES # BLD AUTO: 0.17 K/UL — SIGNIFICANT CHANGE UP (ref 0–0.9)
MONOCYTES NFR BLD AUTO: 2.3 % — SIGNIFICANT CHANGE UP (ref 2–14)
NEUTROPHILS # BLD AUTO: 6.35 K/UL — SIGNIFICANT CHANGE UP (ref 1.8–7.4)
NEUTROPHILS NFR BLD AUTO: 85.4 % — HIGH (ref 43–77)
NRBC # BLD: 0 /100 WBCS — SIGNIFICANT CHANGE UP (ref 0–0)
PLATELET # BLD AUTO: 184 K/UL — SIGNIFICANT CHANGE UP (ref 150–400)
POTASSIUM SERPL-MCNC: 4 MMOL/L — SIGNIFICANT CHANGE UP (ref 3.5–5.3)
POTASSIUM SERPL-SCNC: 4 MMOL/L — SIGNIFICANT CHANGE UP (ref 3.5–5.3)
RBC # BLD: 3.71 M/UL — LOW (ref 4.2–5.8)
RBC # FLD: 13.4 % — SIGNIFICANT CHANGE UP (ref 10.3–14.5)
RH IG SCN BLD-IMP: POSITIVE — SIGNIFICANT CHANGE UP
RH IG SCN BLD-IMP: POSITIVE — SIGNIFICANT CHANGE UP
SODIUM SERPL-SCNC: 139 MMOL/L — SIGNIFICANT CHANGE UP (ref 135–145)
WBC # BLD: 7.44 K/UL — SIGNIFICANT CHANGE UP (ref 3.8–10.5)
WBC # FLD AUTO: 7.44 K/UL — SIGNIFICANT CHANGE UP (ref 3.8–10.5)

## 2020-01-28 PROCEDURE — 88311 DECALCIFY TISSUE: CPT | Mod: 26

## 2020-01-28 PROCEDURE — 72020 X-RAY EXAM OF SPINE 1 VIEW: CPT | Mod: 26

## 2020-01-28 PROCEDURE — 63267 EXCISE INTRSPINL LESION LMBR: CPT | Mod: 80

## 2020-01-28 PROCEDURE — 63267 EXCISE INTRSPINL LESION LMBR: CPT

## 2020-01-28 PROCEDURE — 88304 TISSUE EXAM BY PATHOLOGIST: CPT | Mod: 26

## 2020-01-28 RX ORDER — SODIUM CHLORIDE 9 MG/ML
3 INJECTION INTRAMUSCULAR; INTRAVENOUS; SUBCUTANEOUS EVERY 8 HOURS
Refills: 0 | Status: DISCONTINUED | OUTPATIENT
Start: 2020-01-28 | End: 2020-01-28

## 2020-01-28 RX ORDER — PANTOPRAZOLE SODIUM 20 MG/1
40 TABLET, DELAYED RELEASE ORAL
Refills: 0 | Status: DISCONTINUED | OUTPATIENT
Start: 2020-01-28 | End: 2020-01-30

## 2020-01-28 RX ORDER — SIMVASTATIN 20 MG/1
40 TABLET, FILM COATED ORAL AT BEDTIME
Refills: 0 | Status: DISCONTINUED | OUTPATIENT
Start: 2020-01-28 | End: 2020-01-30

## 2020-01-28 RX ORDER — LOSARTAN POTASSIUM 100 MG/1
100 TABLET, FILM COATED ORAL DAILY
Refills: 0 | Status: DISCONTINUED | OUTPATIENT
Start: 2020-01-28 | End: 2020-01-30

## 2020-01-28 RX ORDER — ACETAMINOPHEN 500 MG
650 TABLET ORAL EVERY 6 HOURS
Refills: 0 | Status: DISCONTINUED | OUTPATIENT
Start: 2020-01-28 | End: 2020-01-30

## 2020-01-28 RX ORDER — TAMSULOSIN HYDROCHLORIDE 0.4 MG/1
0.4 CAPSULE ORAL AT BEDTIME
Refills: 0 | Status: DISCONTINUED | OUTPATIENT
Start: 2020-01-28 | End: 2020-01-30

## 2020-01-28 RX ORDER — HYDROMORPHONE HYDROCHLORIDE 2 MG/ML
0.25 INJECTION INTRAMUSCULAR; INTRAVENOUS; SUBCUTANEOUS
Refills: 0 | Status: DISCONTINUED | OUTPATIENT
Start: 2020-01-28 | End: 2020-01-29

## 2020-01-28 RX ORDER — CEFAZOLIN SODIUM 1 G
2000 VIAL (EA) INJECTION EVERY 8 HOURS
Refills: 0 | Status: COMPLETED | OUTPATIENT
Start: 2020-01-28 | End: 2020-01-29

## 2020-01-28 RX ORDER — AMLODIPINE BESYLATE 2.5 MG/1
2.5 TABLET ORAL DAILY
Refills: 0 | Status: DISCONTINUED | OUTPATIENT
Start: 2020-01-28 | End: 2020-01-30

## 2020-01-28 RX ORDER — POLYETHYLENE GLYCOL 3350 17 G/17G
17 POWDER, FOR SOLUTION ORAL DAILY
Refills: 0 | Status: DISCONTINUED | OUTPATIENT
Start: 2020-01-28 | End: 2020-01-30

## 2020-01-28 RX ORDER — NALOXONE HYDROCHLORIDE 4 MG/.1ML
0.1 SPRAY NASAL
Refills: 0 | Status: DISCONTINUED | OUTPATIENT
Start: 2020-01-28 | End: 2020-01-29

## 2020-01-28 RX ORDER — HYDROMORPHONE HYDROCHLORIDE 2 MG/ML
0.25 INJECTION INTRAMUSCULAR; INTRAVENOUS; SUBCUTANEOUS
Refills: 0 | Status: DISCONTINUED | OUTPATIENT
Start: 2020-01-28 | End: 2020-01-28

## 2020-01-28 RX ORDER — DEXAMETHASONE 0.5 MG/5ML
4 ELIXIR ORAL EVERY 6 HOURS
Refills: 0 | Status: COMPLETED | OUTPATIENT
Start: 2020-01-28 | End: 2020-01-29

## 2020-01-28 RX ORDER — DEXAMETHASONE 0.5 MG/5ML
1 ELIXIR ORAL EVERY 6 HOURS
Refills: 0 | Status: DISCONTINUED | OUTPATIENT
Start: 2020-01-31 | End: 2020-01-30

## 2020-01-28 RX ORDER — SODIUM CHLORIDE 9 MG/ML
1000 INJECTION, SOLUTION INTRAVENOUS
Refills: 0 | Status: DISCONTINUED | OUTPATIENT
Start: 2020-01-28 | End: 2020-01-30

## 2020-01-28 RX ORDER — ASPIRIN/CALCIUM CARB/MAGNESIUM 324 MG
81 TABLET ORAL DAILY
Refills: 0 | Status: DISCONTINUED | OUTPATIENT
Start: 2020-01-29 | End: 2020-01-30

## 2020-01-28 RX ORDER — DIAZEPAM 5 MG
5 TABLET ORAL EVERY 12 HOURS
Refills: 0 | Status: DISCONTINUED | OUTPATIENT
Start: 2020-01-28 | End: 2020-01-30

## 2020-01-28 RX ORDER — ONDANSETRON 8 MG/1
4 TABLET, FILM COATED ORAL EVERY 6 HOURS
Refills: 0 | Status: DISCONTINUED | OUTPATIENT
Start: 2020-01-28 | End: 2020-01-29

## 2020-01-28 RX ORDER — SENNA PLUS 8.6 MG/1
2 TABLET ORAL AT BEDTIME
Refills: 0 | Status: DISCONTINUED | OUTPATIENT
Start: 2020-01-28 | End: 2020-01-30

## 2020-01-28 RX ORDER — ONDANSETRON 8 MG/1
4 TABLET, FILM COATED ORAL ONCE
Refills: 0 | Status: DISCONTINUED | OUTPATIENT
Start: 2020-01-28 | End: 2020-01-28

## 2020-01-28 RX ORDER — HYDROMORPHONE HYDROCHLORIDE 2 MG/ML
30 INJECTION INTRAMUSCULAR; INTRAVENOUS; SUBCUTANEOUS
Refills: 0 | Status: DISCONTINUED | OUTPATIENT
Start: 2020-01-28 | End: 2020-01-29

## 2020-01-28 RX ORDER — DEXAMETHASONE 0.5 MG/5ML
4 ELIXIR ORAL EVERY 6 HOURS
Refills: 0 | Status: DISCONTINUED | OUTPATIENT
Start: 2020-01-28 | End: 2020-01-29

## 2020-01-28 RX ORDER — LEVOTHYROXINE SODIUM 125 MCG
50 TABLET ORAL DAILY
Refills: 0 | Status: DISCONTINUED | OUTPATIENT
Start: 2020-01-28 | End: 2020-01-30

## 2020-01-28 RX ORDER — DEXAMETHASONE 0.5 MG/5ML
3 ELIXIR ORAL EVERY 6 HOURS
Refills: 0 | Status: COMPLETED | OUTPATIENT
Start: 2020-01-29 | End: 2020-01-30

## 2020-01-28 RX ORDER — ONDANSETRON 8 MG/1
4 TABLET, FILM COATED ORAL EVERY 6 HOURS
Refills: 0 | Status: DISCONTINUED | OUTPATIENT
Start: 2020-01-28 | End: 2020-01-30

## 2020-01-28 RX ORDER — MAGNESIUM HYDROXIDE 400 MG/1
30 TABLET, CHEWABLE ORAL EVERY 12 HOURS
Refills: 0 | Status: DISCONTINUED | OUTPATIENT
Start: 2020-01-28 | End: 2020-01-30

## 2020-01-28 RX ORDER — DEXAMETHASONE 0.5 MG/5ML
2 ELIXIR ORAL EVERY 6 HOURS
Refills: 0 | Status: DISCONTINUED | OUTPATIENT
Start: 2020-01-30 | End: 2020-01-30

## 2020-01-28 RX ORDER — HYDROCORTISONE 20 MG
100 TABLET ORAL EVERY 8 HOURS
Refills: 0 | Status: DISCONTINUED | OUTPATIENT
Start: 2020-01-28 | End: 2020-01-28

## 2020-01-28 RX ADMIN — SODIUM CHLORIDE 100 MILLILITER(S): 9 INJECTION, SOLUTION INTRAVENOUS at 13:42

## 2020-01-28 RX ADMIN — HYDROMORPHONE HYDROCHLORIDE 30 MILLILITER(S): 2 INJECTION INTRAMUSCULAR; INTRAVENOUS; SUBCUTANEOUS at 22:05

## 2020-01-28 RX ADMIN — SENNA PLUS 2 TABLET(S): 8.6 TABLET ORAL at 22:03

## 2020-01-28 RX ADMIN — HYDROMORPHONE HYDROCHLORIDE 30 MILLILITER(S): 2 INJECTION INTRAMUSCULAR; INTRAVENOUS; SUBCUTANEOUS at 19:19

## 2020-01-28 RX ADMIN — Medication 100 MILLIGRAM(S): at 18:28

## 2020-01-28 RX ADMIN — SODIUM CHLORIDE 100 MILLILITER(S): 9 INJECTION, SOLUTION INTRAVENOUS at 22:04

## 2020-01-28 RX ADMIN — HYDROMORPHONE HYDROCHLORIDE 30 MILLILITER(S): 2 INJECTION INTRAMUSCULAR; INTRAVENOUS; SUBCUTANEOUS at 17:12

## 2020-01-28 RX ADMIN — SIMVASTATIN 40 MILLIGRAM(S): 20 TABLET, FILM COATED ORAL at 22:03

## 2020-01-28 RX ADMIN — HYDROMORPHONE HYDROCHLORIDE 30 MILLILITER(S): 2 INJECTION INTRAMUSCULAR; INTRAVENOUS; SUBCUTANEOUS at 18:17

## 2020-01-28 RX ADMIN — TAMSULOSIN HYDROCHLORIDE 0.4 MILLIGRAM(S): 0.4 CAPSULE ORAL at 22:03

## 2020-01-28 RX ADMIN — Medication 4 MILLIGRAM(S): at 18:28

## 2020-01-28 RX ADMIN — HYDROMORPHONE HYDROCHLORIDE 30 MILLILITER(S): 2 INJECTION INTRAMUSCULAR; INTRAVENOUS; SUBCUTANEOUS at 14:04

## 2020-01-28 RX ADMIN — Medication 4 MILLIGRAM(S): at 23:49

## 2020-01-28 RX ADMIN — POLYETHYLENE GLYCOL 3350 17 GRAM(S): 17 POWDER, FOR SOLUTION ORAL at 23:49

## 2020-01-28 NOTE — PHYSICAL THERAPY INITIAL EVALUATION ADULT - ADDITIONAL COMMENTS
Pt lives in a private house alone with one flight of steps inside. Pt was Ind with all ADLs and amb without AD. Upon discharge, pt will be staying at his dtr's house which has no steps and his dtr will be able to provide assistance.

## 2020-01-28 NOTE — CHART NOTE - NSCHARTNOTEFT_GEN_A_CORE
Patient seen in RR, resting without complaints.  Denies Chest Pain, SOB, N/V.  Pre op s/sx: numbness, weakness B/L LE, LLE pain; Post op, patient reports: low back soreness.    T(C): 36.5 (01-28-20 @ 13:15), Max: 36.6 (01-28-20 @ 07:15)  HR: 60 (01-28-20 @ 16:00) (60 - 76)  BP: 122/77 (01-28-20 @ 16:00) (112/76 - 147/91)  RR: 18 (01-28-20 @ 16:00) (16 - 18)  SpO2: 98% (01-28-20 @ 16:00) (96% - 100%)  Wt(kg): --    Exam:   Alert/Oriented, No Acute Distress  Cards: +S1/S2, RRR  Pulm: CTAB           Dressing: [x] clean/dry/intact  [ ] Other:           Drains: : HV x 1; maintaining good suction         Sensation: [x] intact to light touch  [ ] decreased:          Motor exam: [  ]          [ ] Upper extremity                      Bi          Tri        Delt                                                    R         5/5        5/5        5/5                                               L          5/5        5/5        5/5                  [ ] Lower extremity           PF          DF         EHL       FHL                                                                                            R        5/5        5/5        5/5       5/5                                                        L         5/5        5/5        5/5       5/5                                                                  Calves Soft/Non-tender bilaterally           Toes warm well perfused; capillary refill <3 seconds   Esteves in place; draining clear, yellow fluid             LABS:                        11.4   7.44  )-----------( 184      ( 28 Jan 2020 14:09 )             34.3     01-28    139  |  105  |  16  ----------------------------<  143<H>  4.0   |  19<L>  |  0.84    Ca    7.8<L>      28 Jan 2020 14:09          A/P : Patient is a 74y Male s/p L3-S1 laminectomy with diskectomy  -    Pain control - PCA  -    Taper  -    DVT ppx: SCDs       -    Physical Therapy  -    Weight bearing status: WBAT [x]        PWB    [ ]     TTWB  [ ]      NWB  [ ]  -    FU AM Labs  -    Monitor HV outputs      Marlene Allsop, PA-C  Team Pager #[phone]

## 2020-01-28 NOTE — PHYSICAL THERAPY INITIAL EVALUATION ADULT - PLANNED THERAPY INTERVENTIONS, PT EVAL
stair negotiation: GOAL: Pt will be able to negotiate 10 steps +HR independently with reciprocal pattern in 2 weeks./transfer training/bed mobility training/gait training

## 2020-01-28 NOTE — PHYSICAL THERAPY INITIAL EVALUATION ADULT - GAIT DEVIATIONS NOTED, PT EVAL
decreased step length/decreased weight-shifting ability/decreased luisa/decreased velocity of limb motion

## 2020-01-28 NOTE — PRE-ANESTHESIA EVALUATION ADULT - NSPROPOSEDPROCEDFT_GEN_ALL_CORE
L2-L5 laminectomies Quality 431: Preventive Care And Screening: Unhealthy Alcohol Use - Screening: Patient screened for unhealthy alcohol use using a single question and scores less than 2 times per year Detail Level: Detailed Quality 226: Preventive Care And Screening: Tobacco Use: Screening And Cessation Intervention: Patient screened for tobacco and never smoked Quality 130: Documentation Of Current Medications In The Medical Record: Current Medications Documented Quality 110: Preventive Care And Screening: Influenza Immunization: Influenza immunization was not ordered or administered, reason not given

## 2020-01-28 NOTE — PHYSICAL THERAPY INITIAL EVALUATION ADULT - DISCHARGE DISPOSITION, PT EVAL
home w/ assist/outpatient PT/assist from dtr as needed. home w/ assist/home w/ home PT/assist from dtr as needed.

## 2020-01-28 NOTE — PHYSICAL THERAPY INITIAL EVALUATION ADULT - PERTINENT HX OF CURRENT PROBLEM, REHAB EVAL
Pt is a 75 y/o male admitted to [facility] on 1/28/20 h/o HTN, HLD, hypothyroidism, GERD, Migraine HA, CAD with 2 stents ( 2018) on ASA, BPH, OA, bilateral hips and knees replacement in the past, obese , BMI 37, NISHI on CPAP,  lumbar spine stenosis, lumbar radiculopathy, c/o numbness and at time weakness of lower extremities with walking, back pain , right leg pain. Pt is now s/p L2-S1 Lami

## 2020-01-29 ENCOUNTER — TRANSCRIPTION ENCOUNTER (OUTPATIENT)
Age: 75
End: 2020-01-29

## 2020-01-29 LAB
ANION GAP SERPL CALC-SCNC: 13 MMOL/L — SIGNIFICANT CHANGE UP (ref 5–17)
BASOPHILS # BLD AUTO: 0.01 K/UL — SIGNIFICANT CHANGE UP (ref 0–0.2)
BASOPHILS NFR BLD AUTO: 0.1 % — SIGNIFICANT CHANGE UP (ref 0–2)
BUN SERPL-MCNC: 15 MG/DL — SIGNIFICANT CHANGE UP (ref 7–23)
CALCIUM SERPL-MCNC: 8.4 MG/DL — SIGNIFICANT CHANGE UP (ref 8.4–10.5)
CHLORIDE SERPL-SCNC: 105 MMOL/L — SIGNIFICANT CHANGE UP (ref 96–108)
CO2 SERPL-SCNC: 20 MMOL/L — LOW (ref 22–31)
CREAT SERPL-MCNC: 0.81 MG/DL — SIGNIFICANT CHANGE UP (ref 0.5–1.3)
EOSINOPHIL # BLD AUTO: 0 K/UL — SIGNIFICANT CHANGE UP (ref 0–0.5)
EOSINOPHIL NFR BLD AUTO: 0 % — SIGNIFICANT CHANGE UP (ref 0–6)
GLUCOSE SERPL-MCNC: 143 MG/DL — HIGH (ref 70–99)
HCT VFR BLD CALC: 30.6 % — LOW (ref 39–50)
HGB BLD-MCNC: 10.3 G/DL — LOW (ref 13–17)
IMM GRANULOCYTES NFR BLD AUTO: 0.7 % — SIGNIFICANT CHANGE UP (ref 0–1.5)
LYMPHOCYTES # BLD AUTO: 0.76 K/UL — LOW (ref 1–3.3)
LYMPHOCYTES # BLD AUTO: 6.7 % — LOW (ref 13–44)
MCHC RBC-ENTMCNC: 31.3 PG — SIGNIFICANT CHANGE UP (ref 27–34)
MCHC RBC-ENTMCNC: 33.7 GM/DL — SIGNIFICANT CHANGE UP (ref 32–36)
MCV RBC AUTO: 93 FL — SIGNIFICANT CHANGE UP (ref 80–100)
MONOCYTES # BLD AUTO: 0.44 K/UL — SIGNIFICANT CHANGE UP (ref 0–0.9)
MONOCYTES NFR BLD AUTO: 3.9 % — SIGNIFICANT CHANGE UP (ref 2–14)
NEUTROPHILS # BLD AUTO: 10.07 K/UL — HIGH (ref 1.8–7.4)
NEUTROPHILS NFR BLD AUTO: 88.6 % — HIGH (ref 43–77)
NRBC # BLD: 0 /100 WBCS — SIGNIFICANT CHANGE UP (ref 0–0)
PLATELET # BLD AUTO: 191 K/UL — SIGNIFICANT CHANGE UP (ref 150–400)
POTASSIUM SERPL-MCNC: 3.8 MMOL/L — SIGNIFICANT CHANGE UP (ref 3.5–5.3)
POTASSIUM SERPL-SCNC: 3.8 MMOL/L — SIGNIFICANT CHANGE UP (ref 3.5–5.3)
RBC # BLD: 3.29 M/UL — LOW (ref 4.2–5.8)
RBC # FLD: 13.3 % — SIGNIFICANT CHANGE UP (ref 10.3–14.5)
SODIUM SERPL-SCNC: 138 MMOL/L — SIGNIFICANT CHANGE UP (ref 135–145)
WBC # BLD: 11.36 K/UL — HIGH (ref 3.8–10.5)
WBC # FLD AUTO: 11.36 K/UL — HIGH (ref 3.8–10.5)

## 2020-01-29 RX ORDER — OXYCODONE HYDROCHLORIDE 5 MG/1
5 TABLET ORAL
Refills: 0 | Status: DISCONTINUED | OUTPATIENT
Start: 2020-01-29 | End: 2020-01-30

## 2020-01-29 RX ADMIN — Medication 4 MILLIGRAM(S): at 05:57

## 2020-01-29 RX ADMIN — POLYETHYLENE GLYCOL 3350 17 GRAM(S): 17 POWDER, FOR SOLUTION ORAL at 21:06

## 2020-01-29 RX ADMIN — Medication 3 MILLIGRAM(S): at 17:33

## 2020-01-29 RX ADMIN — SIMVASTATIN 40 MILLIGRAM(S): 20 TABLET, FILM COATED ORAL at 21:05

## 2020-01-29 RX ADMIN — SENNA PLUS 2 TABLET(S): 8.6 TABLET ORAL at 21:05

## 2020-01-29 RX ADMIN — Medication 3 MILLIGRAM(S): at 23:19

## 2020-01-29 RX ADMIN — Medication 100 MILLIGRAM(S): at 02:04

## 2020-01-29 RX ADMIN — HYDROMORPHONE HYDROCHLORIDE 30 MILLILITER(S): 2 INJECTION INTRAMUSCULAR; INTRAVENOUS; SUBCUTANEOUS at 06:03

## 2020-01-29 RX ADMIN — Medication 81 MILLIGRAM(S): at 11:46

## 2020-01-29 RX ADMIN — PANTOPRAZOLE SODIUM 40 MILLIGRAM(S): 20 TABLET, DELAYED RELEASE ORAL at 06:00

## 2020-01-29 RX ADMIN — LOSARTAN POTASSIUM 100 MILLIGRAM(S): 100 TABLET, FILM COATED ORAL at 05:57

## 2020-01-29 RX ADMIN — HYDROMORPHONE HYDROCHLORIDE 30 MILLILITER(S): 2 INJECTION INTRAMUSCULAR; INTRAVENOUS; SUBCUTANEOUS at 11:35

## 2020-01-29 RX ADMIN — Medication 50 MICROGRAM(S): at 06:50

## 2020-01-29 RX ADMIN — Medication 4 MILLIGRAM(S): at 11:46

## 2020-01-29 RX ADMIN — TAMSULOSIN HYDROCHLORIDE 0.4 MILLIGRAM(S): 0.4 CAPSULE ORAL at 21:05

## 2020-01-29 RX ADMIN — HYDROMORPHONE HYDROCHLORIDE 30 MILLILITER(S): 2 INJECTION INTRAMUSCULAR; INTRAVENOUS; SUBCUTANEOUS at 02:04

## 2020-01-29 RX ADMIN — AMLODIPINE BESYLATE 2.5 MILLIGRAM(S): 2.5 TABLET ORAL at 05:57

## 2020-01-29 RX ADMIN — HYDROMORPHONE HYDROCHLORIDE 30 MILLILITER(S): 2 INJECTION INTRAMUSCULAR; INTRAVENOUS; SUBCUTANEOUS at 07:20

## 2020-01-29 NOTE — CONSULT NOTE ADULT - ASSESSMENT
74 yr old male with h/o HTN, HLD, hypothyroidism, GERD, Migraine HA, CAD with 2 stents ( 2018) on ASA, BPH, OA, bilateral hips and knees replacement in the past, obese , BMI 37, NISHI on CPAP,  lumbar spine stenosis, lumbar radiculopathy, c/o numbness and at time weakness of lower extremities with walking, back pain , right leg pain   sp L3-S1 laminectomy POD #1   pain control  PT  neurosx/ortho mgmt appreciated    #HTN bp optimal   resume home meds    #HLD: cont statin  #hypothyroidism: resume synthroid    DVT ppx    [facility]  [phone]  will cont to follow  will update Dr Mayer

## 2020-01-29 NOTE — PROGRESS NOTE ADULT - SUBJECTIVE AND OBJECTIVE BOX
Day 1 of Anesthesia Pain Management Service    SUBJECTIVE: Pain is controlled    Pain Scale Score:	[X] Refer to charted pain scores    THERAPY:    [ ] IV PCA Morphine		        [ ] 5 mg/mL	[ ] 1 mg/mL  [X] IV PCA Hydromorphone	[ ] 5 mg/mL	[X] 1 mg/mL  [ ] IV PCA Fentanyl		        [ ] 50 micrograms/mL    Demand dose: 0.2 mg     Lockout: 6 minutes   Continuous Rate: 0 mg/hr  4 Hour Limit: 4 mg    MEDICATIONS  (STANDING):  amLODIPine   Tablet 2.5 milliGRAM(s) Oral daily  aspirin  chewable 81 milliGRAM(s) Oral daily  ceFAZolin   IVPB 2000 milliGRAM(s) IV Intermittent once  dexAMETHasone     Tablet 3 milliGRAM(s) Oral every 6 hours  dexAMETHasone  Injectable 4 milliGRAM(s) IV Push every 6 hours  HYDROmorphone PCA (1 mG/mL) 30 milliLiter(s) PCA Continuous PCA Continuous  lactated ringers. 1000 milliLiter(s) (100 mL/Hr) IV Continuous <Continuous>  levothyroxine 50 MICROGram(s) Oral daily  lidocaine 1% Injectable 0.2 milliLiter(s) Local Injection once  losartan 100 milliGRAM(s) Oral daily  pantoprazole    Tablet 40 milliGRAM(s) Oral before breakfast  propranolol 60 milliGRAM(s) Oral daily  senna 2 Tablet(s) Oral at bedtime  simvastatin 40 milliGRAM(s) Oral at bedtime  tamsulosin 0.4 milliGRAM(s) Oral at bedtime    MEDICATIONS  (PRN):  acetaminophen   Tablet .. 650 milliGRAM(s) Oral every 6 hours PRN Temp greater or equal to 38C (100.4F), Mild Pain (1 - 3)  dexAMETHasone  Injectable 4 milliGRAM(s) IV Push every 6 hours PRN Nausea, IF ondansetron is ineffective after 30 - 60 minute  diazepam    Tablet 5 milliGRAM(s) Oral every 12 hours PRN Muscle Spasm  HYDROmorphone PCA (1 mG/mL) Rescue Clinician Bolus 0.25 milliGRAM(s) IV Push every 15 minutes PRN for Pain Scale GREATER THAN 6  magnesium hydroxide Suspension 30 milliLiter(s) Oral every 12 hours PRN Constipation  naloxone Injectable 0.1 milliGRAM(s) IV Push every 3 minutes PRN For ANY of the following changes in patient status:  A. RR LESS THAN 10 breaths per minute, B. Oxygen saturation LESS THAN 90%, C. Sedation score of 6  ondansetron Injectable 4 milliGRAM(s) IV Push every 6 hours PRN Nausea  ondansetron Injectable 4 milliGRAM(s) IV Push every 6 hours PRN Nausea  polyethylene glycol 3350 17 Gram(s) Oral daily PRN Constipation      OBJECTIVE:    Sedation Score:	[ X] Alert	 [ ] Drowsy 	[ ] Arousable	[ ] Asleep	[ ] Unresponsive    Side Effects:	[X ] None	[ ] Nausea	[ ] Vomiting	[ ] Pruritus  		[ ] Other:    Vital Signs Last 24 Hrs  T(C): 36.8 (29 Jan 2020 08:36), Max: 37.1 (28 Jan 2020 18:00)  T(F): 98.3 (29 Jan 2020 08:36), Max: 98.7 (28 Jan 2020 18:00)  HR: 60 (29 Jan 2020 08:36) (60 - 82)  BP: 125/75 (29 Jan 2020 08:36) (103/61 - 129/83)  BP(mean): 90 (28 Jan 2020 17:00) (88 - 100)  RR: 16 (29 Jan 2020 08:36) (16 - 18)  SpO2: 96% (29 Jan 2020 08:36) (94% - 100%)    ASSESSMENT/ PLAN    Therapy to  be:               [  ] Continued   [X ] Discontinued   [ X] Changed to PRN Analgesics    Documentation and Verification of current medications:   [X] Done	[ ] Not done, not eligible    Comments: Pain controlled PCA use minimal. D\C PCA transition to prn analgesics

## 2020-01-29 NOTE — PROGRESS NOTE ADULT - SUBJECTIVE AND OBJECTIVE BOX
Orthopaedic Surgery Progress Note    Subjective:   Patient seen and examined  No acute events overnight  Ambulated with PT yesterday  No pain with ambulation  Having some back pain when getting into and out of bed.  No radicular pain.     Objective:  T(C): 36.6 (01-29-20 @ 05:52), Max: 37.1 (01-28-20 @ 18:00)  HR: 64 (01-29-20 @ 05:52) (60 - 82)  BP: 127/72 (01-29-20 @ 05:52) (103/61 - 147/91)  RR: 18 (01-29-20 @ 05:52) (16 - 18)  SpO2: 96% (01-29-20 @ 05:52) (94% - 100%)  Wt(kg): --    01-28 @ 07:01  -  01-29 @ 06:14  --------------------------------------------------------  IN: 1050 mL / OUT: 2205 mL / NET: -1155 mL        PE    NAD  Back:   dressing C/D/I, mild appropriate trevor-incisional ttp  HMV in place w/ serosanguinous output 105 since OR  Neuro:  RLE IP 5/5 HS 5/5 Q 5/5 GS 5/5 TA 5/5 EHL 5/5   SILT L2-S1  LLE IP 5/5 HS 5/5 Q 5/5 GS 5/5 TA 5/5 EHL 5/5  SILT L2-S1  WWP BLE                          11.4   7.44  )-----------( 184      ( 28 Jan 2020 14:09 )             34.3     01-28    139  |  105  |  16  ----------------------------<  143<H>  4.0   |  19<L>  |  0.84    Ca    7.8<L>      28 Jan 2020 14:09            74y Male s/p L2-S1 Laminectom  - Pain control  - FU labs  - WBAT  - PT/OT/OOB  - I/S  - Monitor HMV output  - SCDs  - Dispo planning

## 2020-01-29 NOTE — DISCHARGE NOTE NURSING/CASE MANAGEMENT/SOCIAL WORK - PATIENT PORTAL LINK FT
You can access the FollowMyHealth Patient Portal offered by [facility] by registering at the following website: [email or web address]. By joining [employer]’s FollowMyHealth portal, you will also be able to view your health information using other applications (apps) compatible with our system.

## 2020-01-29 NOTE — CONSULT NOTE ADULT - SUBJECTIVE AND OBJECTIVE BOX
Patient is a 74y old  Male who presents with a chief complaint of low back pain (14 Jan 2020 11:51)      HPI:  74 yr old male with h/o HTN, HLD, hypothyroidism, GERD, Migraine HA, CAD with 2 stents ( 2018) on ASA, BPH, OA, bilateral hips and knees replacement in the past, obese , BMI 37, NISHI on CPAP,  lumbar spine stenosis, lumbar radiculopathy, c/o numbness and at time weakness of lower extremities with walking, back pain , right leg pain . Patient  presents to PST for scheduled L3-S1 posterior lumbar laminectomies and discectomy on 1/28/20. Patient reports slight cold symptoms, nasal congestion, sore throat, has appointment with ENT today to follow up, understand to inform the surgeon if sick . Denies fever, chills, no acute complaint. Ambulating without assistance. (14 Jan 2020 11:51)    course: sp L3-S1 laminectomy  feels well      PAST MEDICAL & SURGICAL HISTORY:  Lumbar radiculopathy  Lumbar spinal stenosis  Stented coronary artery: x 2 2018  Gastroesophageal reflux disease without esophagitis: controlled on omeprazole  Wrist pain, right: h/o surgery  Obesity: BMI 37  Hernia of abdominal wall: h/o colon resection  Migraine: chronic  NISHI on CPAP  Heart murmur: as child  Hypothyroid  Dyslipidemia  Hypertension  Osteoarthritis  S/P knee replacement: left 4/2014 , right 2015  S/P colonoscopy: 12/4/2019 small benign  polyp  S/P endoscopy: Spring  2019  S/P tonsillectomy and adenoidectomy: as child  S/P total hip arthroplasty: left 2013  S/P total hip arthroplasty: right 2012  S/P total knee arthroplasty, right: 2011  S/P carpal tunnel release: left with ulnar nerve transposition 2009  S/P carpal tunnel release: right with ulnar nerve transposition 2009  S/P colon resection: with temporary colostomy for benign mass 1993      FAMILY HISTORY:  Family history of hypertension (Sibling)  Family history of dementia (Sibling)  Family history of prostate cancer  Family history of type 2 diabetes mellitus  Family history of breast cancer: with mets      SOCIAL HISTORY:    Allergies    No Known Allergies    Intolerances          REVIEW OF SYSEMS:  General: no weakness, no fever/chills, no weight loss/gain  Skin/Breast: no rash, no jaundice  Ophthalmologic: no vision changes, no dry eyes   Respiratory and Thorax: no cough, no wheezing, no hemoptysis, no dyspnea  Cardiovascular: no chest pain, no shortness of breath, no orthopnea  Gastrointestinal: no n/v/d, no abdominal pain, no dysphagia   Genitourinary: no dysuria, no frequency, no nocturia, no hematuria  Musculoskeletal: no trauma, no sprain/strain, no myalgias, no arthralgias, no fracture  Neurological: no HA, no dizziness, no weakness, no numbness  Psychiatric: no depression, no SI/HI  Hematology/Lymphatics: no easy bruising  Endocrine: no heat or cold intolerance. no weight gain or loss  Allergic/Immunologic: no allergy or recent reaction       Vital Signs Last 24 Hrs  T(C): 36.7 (29 Jan 2020 21:10), Max: 36.8 (29 Jan 2020 02:00)  T(F): 98.1 (29 Jan 2020 21:10), Max: 98.3 (29 Jan 2020 08:36)  HR: 62 (29 Jan 2020 21:10) (60 - 71)  BP: 120/65 (29 Jan 2020 21:10) (103/61 - 127/72)  BP(mean): --  RR: 16 (29 Jan 2020 21:10) (16 - 18)  SpO2: 98% (29 Jan 2020 21:10) (96% - 98%)  I&O's Summary    28 Jan 2020 07:01  -  29 Jan 2020 07:00  --------------------------------------------------------  IN: 1050 mL / OUT: 2205 mL / NET: -1155 mL    29 Jan 2020 07:01  -  29 Jan 2020 23:23  --------------------------------------------------------  IN: 1500 mL / OUT: 1865 mL / NET: -365 mL        PHYSICAL EXAM:  GENERAL: NAD, Comfortable  HEAD:  Atraumatic, Normocephalic  EYES: EOMI, PERRLA, conjunctiva and sclera clear  NECK: Supple, No JVD  CHEST/LUNG: Clear to auscultation bilaterally; No wheeze  HEART: Regular rate and rhythm; No murmurs, rubs, or gallops  ABDOMEN: Soft, Nontender, Nondistended; Bowel sounds present  Neuro: AAOx3, no focal deficit, 5/5 b/l extremities  EXTREMITIES:  2+ Peripheral Pulses, No clubbing, cyanosis, or edema  SKIN: No rashes or lesions    LABS:                        10.3   11.36 )-----------( 191      ( 29 Jan 2020 06:07 )             30.6     01-29    138  |  105  |  15  ----------------------------<  143<H>  3.8   |  20<L>  |  0.81    Ca    8.4      29 Jan 2020 06:07        CAPILLARY BLOOD GLUCOSE                RADIOLOGY & ADDITIONAL TESTS:    Imaging Personally Reviewed:  [x] YES  [ ] NO    Consultant(s) Notes Reviewed:  [x] YES  [ ] NO      MEDICATIONS  (STANDING):  amLODIPine   Tablet 2.5 milliGRAM(s) Oral daily  aspirin  chewable 81 milliGRAM(s) Oral daily  ceFAZolin   IVPB 2000 milliGRAM(s) IV Intermittent once  dexAMETHasone     Tablet 3 milliGRAM(s) Oral every 6 hours  lactated ringers. 1000 milliLiter(s) (100 mL/Hr) IV Continuous <Continuous>  levothyroxine 50 MICROGram(s) Oral daily  lidocaine 1% Injectable 0.2 milliLiter(s) Local Injection once  losartan 100 milliGRAM(s) Oral daily  pantoprazole    Tablet 40 milliGRAM(s) Oral before breakfast  propranolol 60 milliGRAM(s) Oral daily  senna 2 Tablet(s) Oral at bedtime  simvastatin 40 milliGRAM(s) Oral at bedtime  tamsulosin 0.4 milliGRAM(s) Oral at bedtime    MEDICATIONS  (PRN):  acetaminophen   Tablet .. 650 milliGRAM(s) Oral every 6 hours PRN Temp greater or equal to 38C (100.4F), Mild Pain (1 - 3)  diazepam    Tablet 5 milliGRAM(s) Oral every 12 hours PRN Muscle Spasm  magnesium hydroxide Suspension 30 milliLiter(s) Oral every 12 hours PRN Constipation  ondansetron Injectable 4 milliGRAM(s) IV Push every 6 hours PRN Nausea  oxyCODONE    IR 5 milliGRAM(s) Oral every 3 hours PRN Moderate Pain (4 - 6)  polyethylene glycol 3350 17 Gram(s) Oral daily PRN Constipation      Care Discussed with Consultants/Other Providers [x] YES  [ ] NO    HEALTH ISSUES - PROBLEM Dx:  CAD (coronary artery disease)  NISHI on CPAP  Lumbar spinal stenosis

## 2020-01-30 ENCOUNTER — TRANSCRIPTION ENCOUNTER (OUTPATIENT)
Age: 75
End: 2020-01-30

## 2020-01-30 VITALS
TEMPERATURE: 98 F | SYSTOLIC BLOOD PRESSURE: 129 MMHG | RESPIRATION RATE: 16 BRPM | OXYGEN SATURATION: 97 % | HEART RATE: 58 BPM | DIASTOLIC BLOOD PRESSURE: 80 MMHG

## 2020-01-30 PROBLEM — M48.061 SPINAL STENOSIS, LUMBAR REGION WITHOUT NEUROGENIC CLAUDICATION: Chronic | Status: ACTIVE | Noted: 2020-01-14

## 2020-01-30 LAB
ANION GAP SERPL CALC-SCNC: 12 MMOL/L — SIGNIFICANT CHANGE UP (ref 5–17)
BASOPHILS # BLD AUTO: 0.01 K/UL — SIGNIFICANT CHANGE UP (ref 0–0.2)
BASOPHILS NFR BLD AUTO: 0.1 % — SIGNIFICANT CHANGE UP (ref 0–2)
BUN SERPL-MCNC: 16 MG/DL — SIGNIFICANT CHANGE UP (ref 7–23)
CALCIUM SERPL-MCNC: 8.5 MG/DL — SIGNIFICANT CHANGE UP (ref 8.4–10.5)
CHLORIDE SERPL-SCNC: 107 MMOL/L — SIGNIFICANT CHANGE UP (ref 96–108)
CO2 SERPL-SCNC: 22 MMOL/L — SIGNIFICANT CHANGE UP (ref 22–31)
CREAT SERPL-MCNC: 0.87 MG/DL — SIGNIFICANT CHANGE UP (ref 0.5–1.3)
EOSINOPHIL # BLD AUTO: 0 K/UL — SIGNIFICANT CHANGE UP (ref 0–0.5)
EOSINOPHIL NFR BLD AUTO: 0 % — SIGNIFICANT CHANGE UP (ref 0–6)
GLUCOSE SERPL-MCNC: 144 MG/DL — HIGH (ref 70–99)
HCT VFR BLD CALC: 29.9 % — LOW (ref 39–50)
HGB BLD-MCNC: 10.3 G/DL — LOW (ref 13–17)
IMM GRANULOCYTES NFR BLD AUTO: 1 % — SIGNIFICANT CHANGE UP (ref 0–1.5)
LYMPHOCYTES # BLD AUTO: 0.87 K/UL — LOW (ref 1–3.3)
LYMPHOCYTES # BLD AUTO: 7 % — LOW (ref 13–44)
MCHC RBC-ENTMCNC: 31.8 PG — SIGNIFICANT CHANGE UP (ref 27–34)
MCHC RBC-ENTMCNC: 34.4 GM/DL — SIGNIFICANT CHANGE UP (ref 32–36)
MCV RBC AUTO: 92.3 FL — SIGNIFICANT CHANGE UP (ref 80–100)
MONOCYTES # BLD AUTO: 0.82 K/UL — SIGNIFICANT CHANGE UP (ref 0–0.9)
MONOCYTES NFR BLD AUTO: 6.6 % — SIGNIFICANT CHANGE UP (ref 2–14)
NEUTROPHILS # BLD AUTO: 10.58 K/UL — HIGH (ref 1.8–7.4)
NEUTROPHILS NFR BLD AUTO: 85.3 % — HIGH (ref 43–77)
NRBC # BLD: 0 /100 WBCS — SIGNIFICANT CHANGE UP (ref 0–0)
PLATELET # BLD AUTO: 178 K/UL — SIGNIFICANT CHANGE UP (ref 150–400)
POTASSIUM SERPL-MCNC: 3.9 MMOL/L — SIGNIFICANT CHANGE UP (ref 3.5–5.3)
POTASSIUM SERPL-SCNC: 3.9 MMOL/L — SIGNIFICANT CHANGE UP (ref 3.5–5.3)
RBC # BLD: 3.24 M/UL — LOW (ref 4.2–5.8)
RBC # FLD: 13.6 % — SIGNIFICANT CHANGE UP (ref 10.3–14.5)
SODIUM SERPL-SCNC: 141 MMOL/L — SIGNIFICANT CHANGE UP (ref 135–145)
WBC # BLD: 12.4 K/UL — HIGH (ref 3.8–10.5)
WBC # FLD AUTO: 12.4 K/UL — HIGH (ref 3.8–10.5)

## 2020-01-30 PROCEDURE — 86850 RBC ANTIBODY SCREEN: CPT

## 2020-01-30 PROCEDURE — 85027 COMPLETE CBC AUTOMATED: CPT

## 2020-01-30 PROCEDURE — 82947 ASSAY GLUCOSE BLOOD QUANT: CPT

## 2020-01-30 PROCEDURE — 82330 ASSAY OF CALCIUM: CPT

## 2020-01-30 PROCEDURE — 80048 BASIC METABOLIC PNL TOTAL CA: CPT

## 2020-01-30 PROCEDURE — 84132 ASSAY OF SERUM POTASSIUM: CPT

## 2020-01-30 PROCEDURE — 97116 GAIT TRAINING THERAPY: CPT

## 2020-01-30 PROCEDURE — 82803 BLOOD GASES ANY COMBINATION: CPT

## 2020-01-30 PROCEDURE — C1769: CPT

## 2020-01-30 PROCEDURE — 85014 HEMATOCRIT: CPT

## 2020-01-30 PROCEDURE — 83605 ASSAY OF LACTIC ACID: CPT

## 2020-01-30 PROCEDURE — 84295 ASSAY OF SERUM SODIUM: CPT

## 2020-01-30 PROCEDURE — 86901 BLOOD TYPING SEROLOGIC RH(D): CPT

## 2020-01-30 PROCEDURE — 88311 DECALCIFY TISSUE: CPT

## 2020-01-30 PROCEDURE — 82435 ASSAY OF BLOOD CHLORIDE: CPT

## 2020-01-30 PROCEDURE — 86900 BLOOD TYPING SEROLOGIC ABO: CPT

## 2020-01-30 PROCEDURE — 88304 TISSUE EXAM BY PATHOLOGIST: CPT

## 2020-01-30 PROCEDURE — 97161 PT EVAL LOW COMPLEX 20 MIN: CPT

## 2020-01-30 PROCEDURE — 82565 ASSAY OF CREATININE: CPT

## 2020-01-30 PROCEDURE — 72020 X-RAY EXAM OF SPINE 1 VIEW: CPT

## 2020-01-30 PROCEDURE — C1889: CPT

## 2020-01-30 RX ORDER — OXYCODONE HYDROCHLORIDE 5 MG/1
1 TABLET ORAL
Qty: 40 | Refills: 0
Start: 2020-01-30

## 2020-01-30 RX ORDER — SENNA PLUS 8.6 MG/1
2 TABLET ORAL
Qty: 0 | Refills: 0 | DISCHARGE
Start: 2020-01-30

## 2020-01-30 RX ORDER — OXYCODONE HYDROCHLORIDE 5 MG/1
1 TABLET ORAL
Qty: 0 | Refills: 0 | DISCHARGE
Start: 2020-01-30

## 2020-01-30 RX ORDER — POLYETHYLENE GLYCOL 3350 17 G/17G
17 POWDER, FOR SOLUTION ORAL
Qty: 0 | Refills: 0 | DISCHARGE
Start: 2020-01-30

## 2020-01-30 RX ORDER — DEXAMETHASONE 0.5 MG/5ML
1 ELIXIR ORAL
Qty: 0 | Refills: 0 | DISCHARGE
Start: 2020-01-30

## 2020-01-30 RX ORDER — ACETAMINOPHEN 500 MG
2 TABLET ORAL
Qty: 0 | Refills: 0 | DISCHARGE
Start: 2020-01-30

## 2020-01-30 RX ORDER — DEXAMETHASONE 0.5 MG/5ML
1 ELIXIR ORAL
Qty: 12 | Refills: 0
Start: 2020-01-30

## 2020-01-30 RX ADMIN — Medication 3 MILLIGRAM(S): at 11:10

## 2020-01-30 RX ADMIN — Medication 3 MILLIGRAM(S): at 05:45

## 2020-01-30 RX ADMIN — PANTOPRAZOLE SODIUM 40 MILLIGRAM(S): 20 TABLET, DELAYED RELEASE ORAL at 05:45

## 2020-01-30 RX ADMIN — LOSARTAN POTASSIUM 100 MILLIGRAM(S): 100 TABLET, FILM COATED ORAL at 05:45

## 2020-01-30 RX ADMIN — AMLODIPINE BESYLATE 2.5 MILLIGRAM(S): 2.5 TABLET ORAL at 05:45

## 2020-01-30 RX ADMIN — Medication 81 MILLIGRAM(S): at 11:09

## 2020-01-30 RX ADMIN — Medication 50 MICROGRAM(S): at 05:45

## 2020-01-30 NOTE — PROGRESS NOTE ADULT - SUBJECTIVE AND OBJECTIVE BOX
ORTHO PROGRESS NOTE     Pt resting comfortably without complaint.  Walking with Physical Therapy     Vital Signs Last 24 Hrs  T(C): 36.7 (30 Jan 2020 04:42), Max: 36.8 (29 Jan 2020 08:36)  T(F): 98.1 (30 Jan 2020 04:42), Max: 98.3 (29 Jan 2020 08:36)  HR: 61 (30 Jan 2020 05:55) (60 - 71)  BP: 137/86 (30 Jan 2020 04:42) (114/72 - 137/86)  BP(mean): --  RR: 16 (30 Jan 2020 04:42) (16 - 16)  SpO2: 99% (30 Jan 2020 05:55) (95% - 100%)    Back: Dressing/ Incision Clean/Dry/Intact,  HV removed with tip intact    LLE: IP/TA/EHL/GSC 5/5                     Sensation intact                      foot wwp  RLE: IP/TA/EHL/GSC 5/5                     Sensation intact                      foot wwp    A/P: 74yMale s/p L3-S1 posterior decompression             - WBAT           - PT/OOB           - DVT ppx- venodynes           - Pain control           - Incentive spirometer

## 2020-01-30 NOTE — DISCHARGE NOTE PROVIDER - NSDCFUSCHEDAPPT_GEN_ALL_CORE_FT
KLEVER BRADLEY ; 02/07/2020 ; NPP [facility] [address] [facility] KLEVER BRADLEY ; 02/07/2020 ; NPP Ortho[facility] [address] [facility], [place]

## 2020-01-30 NOTE — DISCHARGE NOTE PROVIDER - NSDCFUADDINST_GEN_ALL_CORE_FT
Continue weight bearing as tolerated ambulation,, keep surgical incision/dressing clean and dry, follow up with Dr. Swan post operative day #14 (2/12/20) in his office for wound check and dressing removal.

## 2020-01-30 NOTE — DISCHARGE NOTE PROVIDER - NSDCCPTREATMENT_GEN_ALL_CORE_FT
PRINCIPAL PROCEDURE  Procedure: Lumbar decompression  Findings and Treatment: PRINCIPAL PROCEDURE  Procedure: Laminectomy of lumbosacral spine  Findings and Treatment: L3-S1      SECONDARY PROCEDURE  Procedure: Discectomy, lumbar  Findings and Treatment:

## 2020-01-30 NOTE — DISCHARGE NOTE PROVIDER - NSDCMRMEDTOKEN_GEN_ALL_CORE_FT
amLODIPine 5 mg oral tablet: 0.5 tab(s) orally once a day  aspirin 81 mg oral tablet: 1 tab(s) orally once a day  Butalbital Compound 325 mg-50 mg-40 mg oral tablet: 2 tab(s) orally every 6 hours, As Needed- for headache   levothyroxine 50 mcg (0.05 mg) oral tablet: 1 tab(s) orally once a day  losartan 100 mg oral tablet: 1 tab(s) orally once a day  pantoprazole 40 mg oral delayed release tablet: 1 tab(s) orally once a day  propranolol 60 mg oral capsule, extended release: 1 cap(s) orally once a day  simvastatin 40 mg oral tablet: 1 tab(s) orally once a day (at bedtime)  tamsulosin 0.4 mg oral capsule: 1 cap(s) orally once a day, pm acetaminophen 325 mg oral tablet: 2 tab(s) orally every 6 hours, As needed, Temp greater or equal to 38C (100.4F), Mild Pain (1 - 3)  amLODIPine 5 mg oral tablet: 0.5 tab(s) orally once a day  aspirin 81 mg oral tablet: 1 tab(s) orally once a day  dexamethasone 1 mg oral tablet: Steroid taper-2 tab(s) orally every 6 hours x 4 dose, 1 tab(s) orally every 6 hours x 4 doses  levothyroxine 50 mcg (0.05 mg) oral tablet: 1 tab(s) orally once a day  losartan 100 mg oral tablet: 1 tab(s) orally once a day  oxyCODONE 5 mg oral tablet: 1 tab(s) orally every 3 hours, As needed, Moderate Pain (4 - 6) MDD:6  pantoprazole 40 mg oral delayed release tablet: 1 tab(s) orally once a day  polyethylene glycol 3350 oral powder for reconstitution: 17 gram(s) orally once a day, As needed, Constipation  propranolol 60 mg oral capsule, extended release: 1 cap(s) orally once a day  senna oral tablet: 2 tab(s) orally once a day (at bedtime)  simvastatin 40 mg oral tablet: 1 tab(s) orally once a day (at bedtime)  tamsulosin 0.4 mg oral capsule: 1 cap(s) orally once a day, pm acetaminophen 325 mg oral tablet: 2 tab(s) orally every 6 hours, As needed, Temp greater or equal to 38C (100.4F), Mild Pain (1 - 3)  amLODIPine 5 mg oral tablet: 0.5 tab(s) orally once a day  aspirin 81 mg oral tablet: 1 tab(s) orally once a day  dexamethasone 1 mg oral tablet: Steroid taper-2 tab(s) orally every 6 hours x 4 dose, 1 tab(s) orally every 6 hours x 4 doses  levothyroxine 50 mcg (0.05 mg) oral tablet: 1 tab(s) orally once a day  losartan 100 mg oral tablet: 1 tab(s) orally once a day  pantoprazole 40 mg oral delayed release tablet: 1 tab(s) orally once a day  polyethylene glycol 3350 oral powder for reconstitution: 17 gram(s) orally once a day, As needed, Constipation  propranolol 60 mg oral capsule, extended release: 1 cap(s) orally once a day  senna oral tablet: 2 tab(s) orally once a day (at bedtime)  simvastatin 40 mg oral tablet: 1 tab(s) orally once a day (at bedtime)  tamsulosin 0.4 mg oral capsule: 1 cap(s) orally once a day, pm  vicodin 5/325: 1 tab(s) orally every 6 hours, As Needed MDD:4

## 2020-01-30 NOTE — DISCHARGE NOTE PROVIDER - NSDCACTIVITY_GEN_ALL_CORE
Stairs allowed/Do not make important decisions/Walking - Indoors allowed/Do not drive or operate machinery/No heavy lifting/straining/Walking - Outdoors allowed

## 2020-01-30 NOTE — DISCHARGE NOTE PROVIDER - HOSPITAL COURSE
Reason for Admission    low back pain         History of Present Illness:    History of Present Illness        74 yr old male with h/o HTN, HLD, hypothyroidism, GERD, Migraine HA, CAD with 2 stents ( 2018) on ASA, BPH, OA, bilateral hips and knees replacement in the past, obese , BMI 37, NISHI on CPAP,  lumbar spine stenosis, lumbar radiculopathy, c/o numbness and at time weakness of lower extremities with walking, back pain , right leg pain . Patient  presents to PST for scheduled L3-S1 posterior lumbar laminectomies and discectomy on 1/28/20. Patient reports slight cold symptoms, nasal congestion, sore throat, has appointment with ENT today to follow up, understand to inform the surgeon if sick . Denies fever, chills, no acute complaint. Ambulating without assistance.         PAST MEDICAL HISTORY:    Dyslipidemia     Gastroesophageal reflux disease without esophagitis controlled on omeprazole    Heart murmur as child    Hernia of abdominal wall h/o colon resection    Hypertension     Hypothyroid     Lumbar radiculopathy     Lumbar spinal stenosis     Migraine chronic    Obesity BMI 37    NISHI on CPAP     Osteoarthritis     Stented coronary artery x 2 2018    Wrist pain, right h/o surgery.         PAST SURGICAL HISTORY:    S/P carpal tunnel release right with ulnar nerve transposition 2009    S/P carpal tunnel release left with ulnar nerve transposition 2009    S/P colon resection with temporary colostomy for benign mass 1993    S/P colonoscopy 12/4/2019 small benign  polyp    S/P endoscopy Spring  2019    S/P knee replacement left 4/2014 , right 2015    S/P tonsillectomy and adenoidectomy as child    S/P total hip arthroplasty right 2012    S/P total hip arthroplasty left 2013    S/P total knee arthroplasty, right 2011.         HOSPITAL COURSE;    73 y/o M underwent L3-S1 Laminectomy, discectomy on 1/28/20 with Dr. Swan.  Patient tolerated procedure well.  Patient was evaluated postoperatively by physical and occupational therapists for weight bearing as tolerated ambulation and cleared patient for discharge home.  Patient advised to keep surgical incision/dressing clean and dry, and  follow up with Dr. Swan in his office post operative day #14 (2/12/20).

## 2020-01-30 NOTE — DISCHARGE NOTE PROVIDER - CARE PROVIDER_API CALL
Antoine Swan)  Orthopaedic Surgery  [address], [address]  [place] [address]  Phone: [phone]  Fax: [phone]  Follow Up Time:

## 2020-02-03 RX ORDER — METAXALONE 800 MG/1
800 TABLET ORAL 4 TIMES DAILY
Qty: 30 | Refills: 1 | Status: ACTIVE | COMMUNITY
Start: 2020-02-03 | End: 1900-01-01

## 2020-02-07 ENCOUNTER — APPOINTMENT (OUTPATIENT)
Dept: ORTHOPEDIC SURGERY | Facility: CLINIC | Age: 75
End: 2020-02-07
Payer: MEDICARE

## 2020-02-07 VITALS — HEIGHT: 70 IN | BODY MASS INDEX: 34.36 KG/M2 | WEIGHT: 240 LBS

## 2020-02-07 PROCEDURE — 72100 X-RAY EXAM L-S SPINE 2/3 VWS: CPT

## 2020-02-07 PROCEDURE — 99024 POSTOP FOLLOW-UP VISIT: CPT

## 2020-02-07 RX ORDER — DIAZEPAM 5 MG/1
5 TABLET ORAL
Qty: 4 | Refills: 0 | Status: DISCONTINUED | COMMUNITY
Start: 2018-12-10 | End: 2020-02-07

## 2020-02-07 RX ORDER — CELECOXIB 50 MG/1
CAPSULE ORAL
Refills: 0 | Status: DISCONTINUED | COMMUNITY
End: 2020-02-07

## 2020-02-07 RX ORDER — DIAZEPAM 5 MG/1
5 TABLET ORAL
Qty: 4 | Refills: 0 | Status: DISCONTINUED | COMMUNITY
Start: 2019-07-19 | End: 2020-02-07

## 2020-03-05 ENCOUNTER — APPOINTMENT (OUTPATIENT)
Dept: ORTHOPEDIC SURGERY | Facility: CLINIC | Age: 75
End: 2020-03-05
Payer: MEDICARE

## 2020-03-05 DIAGNOSIS — M79.645 PAIN IN LEFT FINGER(S): ICD-10-CM

## 2020-03-05 PROCEDURE — 99213 OFFICE O/P EST LOW 20 MIN: CPT | Mod: 25

## 2020-03-05 PROCEDURE — 20550 NJX 1 TENDON SHEATH/LIGAMENT: CPT | Mod: 59,RT

## 2020-03-06 ENCOUNTER — APPOINTMENT (OUTPATIENT)
Dept: ORTHOPEDIC SURGERY | Facility: CLINIC | Age: 75
End: 2020-03-06
Payer: MEDICARE

## 2020-03-06 VITALS — BODY MASS INDEX: 34.36 KG/M2 | WEIGHT: 240 LBS | HEIGHT: 70 IN

## 2020-03-06 PROCEDURE — 99024 POSTOP FOLLOW-UP VISIT: CPT

## 2020-03-06 NOTE — ADDENDUM
[FreeTextEntry1] : I, Dunia Pimentel wrote this note acting as a scribe for Dr. Alfonso Gandara on Mar 05, 2020.

## 2020-03-06 NOTE — DISCUSSION/SUMMARY
[FreeTextEntry1] : The patient wishes to proceed with a cortisone injection at this time. The skin was prepped with alcohol and sprayed with Ethyl Chloride. An injection of 0.5 cc 1% Lidocaine without epinephrine, 0.25 cc Kenalog 40 mg, and 0.25 cc Dexamethasone was administered into the flexor tendon sheath of the right long finger (1/3) and left long finger (2/3). The patient tolerated the procedure well. Apply ice. \par \par The patient was advised to soak the hand in warm water and Epsom salt. \par Topical analgesics as needed. \par NSAIDs as tolerated. \par Follow up as needed.

## 2020-03-06 NOTE — HISTORY OF PRESENT ILLNESS
[Right] : right hand dominant [FreeTextEntry1] : Patient is a 74 year old male who presents for a followup visit involving the bilateral long finger pain.  Denies triggering.  He has pain with extension of the finger. There is tenderness over the palmar aspect of the MP joint. A cortisone injection was administered into the left long finger on 11/19/2019. There has been no treatment to date for the right long finger. He would like an injection for both today. \par \par Of note, patient recently underwent angioplasty with 2 coronary stents. He is also 6 weeks s/p lumbar laminectomy with Dr. Swan. He is interested in undergoing decompression of the trigger fingers, but cannot until June-July.

## 2020-03-06 NOTE — PHYSICAL EXAM
[de-identified] : Patient is well-nourished, well developed, alert and in no acute distress. Breathing is unlabored. He is grossly oriented to person, place and time. \par \par Right Wrist: There is tenderness to palpation along the A1 pulley of the long finger. No swelling or deformities. The ROM is full, but with pain and crepitus. There is no joint instability on provocative testing\par Strength : extension, flexion, ulnar deviation and radial deviation 5/5\par Tests/Signs : Tinel's sign negative over carpal tunnel, Phalen’s test negative \par \par Left hand: There is A1 pulley tenderness in the long finger, full arc of motion in the fingers, and all intrinsic and extrinsic hand muscles 5/5. No joint instability on provocative testing, sensation is intact to light touch, and no skin lesions or discoloration.  [de-identified] : No imaging done today.

## 2020-05-08 ENCOUNTER — APPOINTMENT (OUTPATIENT)
Dept: ORTHOPEDIC SURGERY | Facility: CLINIC | Age: 75
End: 2020-05-08
Payer: MEDICARE

## 2020-05-08 VITALS — BODY MASS INDEX: 34.36 KG/M2 | WEIGHT: 240 LBS | HEIGHT: 70 IN

## 2020-05-08 VITALS — TEMPERATURE: 97 F

## 2020-05-08 PROCEDURE — 99213 OFFICE O/P EST LOW 20 MIN: CPT

## 2020-05-08 RX ORDER — DULOXETINE HYDROCHLORIDE 40 MG/1
CAPSULE, DELAYED RELEASE PELLETS ORAL
Refills: 0 | Status: ACTIVE | COMMUNITY

## 2020-06-16 VITALS
SYSTOLIC BLOOD PRESSURE: 176 MMHG | OXYGEN SATURATION: 96 % | WEIGHT: 248.9 LBS | RESPIRATION RATE: 18 BRPM | HEART RATE: 72 BPM | DIASTOLIC BLOOD PRESSURE: 98 MMHG | HEIGHT: 70 IN | TEMPERATURE: 98 F

## 2020-06-16 RX ORDER — AMLODIPINE BESYLATE 2.5 MG/1
0.5 TABLET ORAL
Qty: 0 | Refills: 0 | DISCHARGE

## 2020-06-16 NOTE — H&P PST ADULT - NSICDXPASTMEDICALHX_GEN_ALL_CORE_FT
PAST MEDICAL HISTORY:  Dyslipidemia     Gastroesophageal reflux disease without esophagitis controlled on omeprazole    Heart murmur as child    Hernia of abdominal wall h/o colon resection    Hypertension     Hypothyroid     Lumbar spinal stenosis     Migraine chronic    Obesity BMI 37    NISHI on CPAP     Osteoarthritis     Prostate cancer 2017 radiation x 10 weeks 2018    Stented coronary artery x 2 2019    Wrist pain, right h/o surgery

## 2020-06-16 NOTE — H&P PST ADULT - ADDITIONAL PE
PE done on admit as per Covid-19 Protocol. tg  Pt. did not take his antihypertensive medication this morning. States that he was supposed to take Amlodipine and Propranolol, but forgot. tg

## 2020-06-16 NOTE — H&P PST ADULT - HISTORY OF PRESENT ILLNESS
74 yr old male with h/o HTN, HLD, hypothyroidism, GERD, Migraine HA, CAD with 2 stents ( 2018) on ASA, BPH,  NISHI on CPAP, lumbar spine stenosis with 2 year history of bilateral long trigger finger . Reports pain and locking of bilateral middle finger . Treated with cortisone injections multiple times with no improvement . scheduled for bilateral long trigger finger release on 6/19/20

## 2020-06-16 NOTE — H&P PST ADULT - MUSCULOSKELETAL
details… detailed exam diminished strength/no joint warmth/no calf tenderness/decreased ROM due to pain/no joint swelling/no joint erythema/bilateral long fingers

## 2020-06-16 NOTE — H&P PST ADULT - NSICDXPROBLEM_GEN_ALL_CORE_FT
PROBLEM DIAGNOSES  Problem: History of trigger finger  Assessment and Plan: bilateral long trigger finger release   Medical clearance   Cardiac clearance   Pre op instructions   COVID testing on 6/18/20

## 2020-06-16 NOTE — H&P PST ADULT - NSICDXFAMILYHX_GEN_ALL_CORE_FT
FAMILY HISTORY:  Family history of breast cancer, with mets mother  Family history of prostate cancer, father  Family history of type 2 diabetes mellitus, mother    Sibling  Still living? Yes, Estimated age:   Family history of dementia, Age at diagnosis: Age Unknown  Family history of hypertension, Age at diagnosis: Age Unknown

## 2020-06-16 NOTE — H&P PST ADULT - NSICDXPASTSURGICALHX_GEN_ALL_CORE_FT
PAST SURGICAL HISTORY:  Hernia, incisional s/p colon resection    S/P carpal tunnel release right with ulnar nerve transposition 2009    S/P carpal tunnel release left with ulnar nerve transposition 2009    S/P colon resection with temporary colostomy for benign mass 1993    S/P colon resection 11/1994 [facility] colostomy , 2/1995 colostomy take down    S/P colonoscopy 12/4/2019 small benign  polyp    S/P endoscopy Spring  2019    S/P knee replacement left 4/2014 , right 2015    S/P tonsillectomy and adenoidectomy as child    S/P total hip arthroplasty right 2012    S/P total hip arthroplasty left 2013    S/P total knee arthroplasty, right 2011

## 2020-06-18 ENCOUNTER — TRANSCRIPTION ENCOUNTER (OUTPATIENT)
Age: 75
End: 2020-06-18

## 2020-06-18 ENCOUNTER — OUTPATIENT (OUTPATIENT)
Dept: OUTPATIENT SERVICES | Facility: HOSPITAL | Age: 75
LOS: 1 days | End: 2020-06-18
Payer: MEDICARE

## 2020-06-18 DIAGNOSIS — Z96.60 PRESENCE OF UNSPECIFIED ORTHOPEDIC JOINT IMPLANT: Chronic | ICD-10-CM

## 2020-06-18 DIAGNOSIS — Z98.89 OTHER SPECIFIED POSTPROCEDURAL STATES: Chronic | ICD-10-CM

## 2020-06-18 DIAGNOSIS — Z90.49 ACQUIRED ABSENCE OF OTHER SPECIFIED PARTS OF DIGESTIVE TRACT: Chronic | ICD-10-CM

## 2020-06-18 DIAGNOSIS — Z11.59 ENCOUNTER FOR SCREENING FOR OTHER VIRAL DISEASES: ICD-10-CM

## 2020-06-18 DIAGNOSIS — Z87.39 PERSONAL HISTORY OF OTHER DISEASES OF THE MUSCULOSKELETAL SYSTEM AND CONNECTIVE TISSUE: ICD-10-CM

## 2020-06-18 DIAGNOSIS — Z96.651 PRESENCE OF RIGHT ARTIFICIAL KNEE JOINT: Chronic | ICD-10-CM

## 2020-06-18 DIAGNOSIS — Z96.659 PRESENCE OF UNSPECIFIED ARTIFICIAL KNEE JOINT: Chronic | ICD-10-CM

## 2020-06-18 DIAGNOSIS — M65.332 TRIGGER FINGER, LEFT MIDDLE FINGER: ICD-10-CM

## 2020-06-18 DIAGNOSIS — Z01.818 ENCOUNTER FOR OTHER PREPROCEDURAL EXAMINATION: ICD-10-CM

## 2020-06-18 DIAGNOSIS — M65.331 TRIGGER FINGER, RIGHT MIDDLE FINGER: ICD-10-CM

## 2020-06-18 DIAGNOSIS — K43.2 INCISIONAL HERNIA WITHOUT OBSTRUCTION OR GANGRENE: Chronic | ICD-10-CM

## 2020-06-18 LAB — SARS-COV-2 RNA SPEC QL NAA+PROBE: SIGNIFICANT CHANGE UP

## 2020-06-18 PROCEDURE — G0463: CPT

## 2020-06-18 PROCEDURE — U0003: CPT

## 2020-06-18 NOTE — ASU PATIENT PROFILE, ADULT - PSH
Hernia, incisional  s/p colon resection  S/P carpal tunnel release  left with ulnar nerve transposition 2009  S/P carpal tunnel release  right with ulnar nerve transposition 2009  S/P colon resection  11/1994 [facility] colostomy , 2/1995 colostomy take down  S/P colon resection  with temporary colostomy for benign mass 1993  S/P colonoscopy  12/4/2019 small benign  polyp  S/P endoscopy  Spring  2019  S/P knee replacement  left 4/2014 , right 2015  S/P tonsillectomy and adenoidectomy  as child  S/P total hip arthroplasty  left 2013  S/P total hip arthroplasty  right 2012  S/P total knee arthroplasty, right  2011

## 2020-06-18 NOTE — ASU PATIENT PROFILE, ADULT - PMH
Dyslipidemia    Gastroesophageal reflux disease without esophagitis  controlled on omeprazole  Heart murmur  as child  Hernia of abdominal wall  h/o colon resection  Hypertension    Hypothyroid    Lumbar spinal stenosis    Migraine  chronic  Obesity  BMI 37  NISHI on CPAP    Osteoarthritis    Prostate cancer  2017 radiation x 10 weeks 2018  Stented coronary artery  x 2 2019  Wrist pain, right  h/o surgery

## 2020-06-19 ENCOUNTER — APPOINTMENT (OUTPATIENT)
Dept: ORTHOPEDIC SURGERY | Facility: HOSPITAL | Age: 75
End: 2020-06-19

## 2020-06-19 ENCOUNTER — OUTPATIENT (OUTPATIENT)
Dept: OUTPATIENT SERVICES | Facility: HOSPITAL | Age: 75
LOS: 1 days | End: 2020-06-19
Payer: MEDICARE

## 2020-06-19 VITALS
HEIGHT: 70 IN | DIASTOLIC BLOOD PRESSURE: 88 MMHG | TEMPERATURE: 98 F | HEART RATE: 72 BPM | RESPIRATION RATE: 21 BRPM | OXYGEN SATURATION: 96 % | WEIGHT: 248.9 LBS | SYSTOLIC BLOOD PRESSURE: 155 MMHG

## 2020-06-19 VITALS
OXYGEN SATURATION: 96 % | RESPIRATION RATE: 18 BRPM | DIASTOLIC BLOOD PRESSURE: 92 MMHG | SYSTOLIC BLOOD PRESSURE: 147 MMHG | HEART RATE: 68 BPM

## 2020-06-19 DIAGNOSIS — Z96.60 PRESENCE OF UNSPECIFIED ORTHOPEDIC JOINT IMPLANT: Chronic | ICD-10-CM

## 2020-06-19 DIAGNOSIS — Z98.89 OTHER SPECIFIED POSTPROCEDURAL STATES: Chronic | ICD-10-CM

## 2020-06-19 DIAGNOSIS — M65.332 TRIGGER FINGER, LEFT MIDDLE FINGER: ICD-10-CM

## 2020-06-19 DIAGNOSIS — Z96.651 PRESENCE OF RIGHT ARTIFICIAL KNEE JOINT: Chronic | ICD-10-CM

## 2020-06-19 DIAGNOSIS — M65.331 TRIGGER FINGER, RIGHT MIDDLE FINGER: ICD-10-CM

## 2020-06-19 DIAGNOSIS — Z96.659 PRESENCE OF UNSPECIFIED ARTIFICIAL KNEE JOINT: Chronic | ICD-10-CM

## 2020-06-19 DIAGNOSIS — K43.2 INCISIONAL HERNIA WITHOUT OBSTRUCTION OR GANGRENE: Chronic | ICD-10-CM

## 2020-06-19 DIAGNOSIS — Z90.49 ACQUIRED ABSENCE OF OTHER SPECIFIED PARTS OF DIGESTIVE TRACT: Chronic | ICD-10-CM

## 2020-06-19 PROCEDURE — 26055 INCISE FINGER TENDON SHEATH: CPT | Mod: F8

## 2020-06-19 PROCEDURE — 93010 ELECTROCARDIOGRAM REPORT: CPT

## 2020-06-19 PROCEDURE — 93005 ELECTROCARDIOGRAM TRACING: CPT

## 2020-06-19 RX ORDER — IBUPROFEN 200 MG
1 TABLET ORAL
Qty: 30 | Refills: 0
Start: 2020-06-19

## 2020-06-19 RX ORDER — OXYCODONE HYDROCHLORIDE 5 MG/1
1 TABLET ORAL
Qty: 5 | Refills: 0
Start: 2020-06-19

## 2020-06-19 RX ORDER — ONDANSETRON 8 MG/1
4 TABLET, FILM COATED ORAL ONCE
Refills: 0 | Status: DISCONTINUED | OUTPATIENT
Start: 2020-06-19 | End: 2020-06-19

## 2020-06-19 RX ORDER — APREPITANT 80 MG/1
40 CAPSULE ORAL ONCE
Refills: 0 | Status: COMPLETED | OUTPATIENT
Start: 2020-06-19 | End: 2020-06-19

## 2020-06-19 RX ORDER — SODIUM CHLORIDE 9 MG/ML
1000 INJECTION, SOLUTION INTRAVENOUS
Refills: 0 | Status: DISCONTINUED | OUTPATIENT
Start: 2020-06-19 | End: 2020-06-19

## 2020-06-19 RX ORDER — CHLORHEXIDINE GLUCONATE 213 G/1000ML
1 SOLUTION TOPICAL ONCE
Refills: 0 | Status: COMPLETED | OUTPATIENT
Start: 2020-06-19 | End: 2020-06-19

## 2020-06-19 RX ORDER — HYDROMORPHONE HYDROCHLORIDE 2 MG/ML
0.5 INJECTION INTRAMUSCULAR; INTRAVENOUS; SUBCUTANEOUS
Refills: 0 | Status: DISCONTINUED | OUTPATIENT
Start: 2020-06-19 | End: 2020-06-19

## 2020-06-19 RX ORDER — CEFAZOLIN SODIUM 1 G
2000 VIAL (EA) INJECTION ONCE
Refills: 0 | Status: COMPLETED | OUTPATIENT
Start: 2020-06-19 | End: 2020-06-19

## 2020-06-19 RX ADMIN — CHLORHEXIDINE GLUCONATE 1 APPLICATION(S): 213 SOLUTION TOPICAL at 08:44

## 2020-06-19 RX ADMIN — SODIUM CHLORIDE 75 MILLILITER(S): 9 INJECTION, SOLUTION INTRAVENOUS at 10:57

## 2020-06-19 RX ADMIN — APREPITANT 40 MILLIGRAM(S): 80 CAPSULE ORAL at 08:44

## 2020-06-19 NOTE — ASU DISCHARGE PLAN (ADULT/PEDIATRIC) - ASU DC SPECIAL INSTRUCTIONSFT
removed dressing after 24 hours and place bandage  if would starts to ooze then re-dress with bandage and apply pressure.  no restriction with range of motion

## 2020-06-19 NOTE — ASU DISCHARGE PLAN (ADULT/PEDIATRIC) - CARE PROVIDER_API CALL
Alfonso Gandara  ORTHOPAEDIC SURGERY  [address] [address]  Phone: [phone]  Fax: [phone]  Follow Up Time:

## 2020-06-22 PROBLEM — C61 MALIGNANT NEOPLASM OF PROSTATE: Chronic | Status: ACTIVE | Noted: 2020-06-16

## 2020-06-22 PROBLEM — Z95.5 PRESENCE OF CORONARY ANGIOPLASTY IMPLANT AND GRAFT: Chronic | Status: ACTIVE | Noted: 2019-03-05

## 2020-06-29 ENCOUNTER — APPOINTMENT (OUTPATIENT)
Dept: ORTHOPEDIC SURGERY | Facility: CLINIC | Age: 75
End: 2020-06-29
Payer: MEDICARE

## 2020-06-29 PROCEDURE — 99024 POSTOP FOLLOW-UP VISIT: CPT

## 2020-06-29 RX ORDER — IBUPROFEN 600 MG/1
600 TABLET, FILM COATED ORAL
Qty: 30 | Refills: 0 | Status: ACTIVE | COMMUNITY
Start: 2020-06-19

## 2020-06-29 RX ORDER — DEXAMETHASONE 1 MG/1
1 TABLET ORAL
Qty: 12 | Refills: 0 | Status: ACTIVE | COMMUNITY
Start: 2020-01-30

## 2020-06-29 RX ORDER — AZITHROMYCIN 250 MG/1
250 TABLET, FILM COATED ORAL
Qty: 6 | Refills: 0 | Status: ACTIVE | COMMUNITY
Start: 2020-01-14

## 2020-06-29 RX ORDER — OXYCODONE AND ACETAMINOPHEN 5; 325 MG/1; MG/1
5-325 TABLET ORAL
Qty: 5 | Refills: 0 | Status: ACTIVE | COMMUNITY
Start: 2020-06-19

## 2020-06-29 RX ORDER — HYDROCODONE BITARTRATE AND ACETAMINOPHEN 5; 325 MG/1; MG/1
5-325 TABLET ORAL
Qty: 120 | Refills: 0 | Status: ACTIVE | COMMUNITY
Start: 2020-06-08

## 2020-06-29 RX ORDER — BUTALBITAL, ACETAMINOPHEN AND CAFFEINE 325; 50; 40 MG/1; MG/1; MG/1
50-325-40 TABLET ORAL
Qty: 120 | Refills: 0 | Status: ACTIVE | COMMUNITY
Start: 2020-01-17

## 2020-06-29 NOTE — ADDENDUM
[FreeTextEntry1] : I, Dunia Pimentel wrote this note acting as a scribe for Dr. Alfonso Gandara on Jun 29, 2020.

## 2020-06-29 NOTE — HISTORY OF PRESENT ILLNESS
[de-identified] : s/p decompression of right long and ring trigger finger, decompression of left long trigger finger on 06/19/2020.  [de-identified] : The patient is a 74 year old male who returns for the 1st postoperative visit after undergoing  decompression of right long and ring trigger finger, decompression of left long trigger finger at [facility]. The surgery was performed on 06/19/2020. The patient is recovering at home. He reports mild-to-moderate postoperative pain in the right hand and minimal pain in the left hand. The affected fingers in the right hand are stiff and swollen. He is unable to tolerate holding a steering wheel with the right. He admits to requiring the prescribed pain medication following the surgery.  [de-identified] : No imaging done today.  [de-identified] : Patient is WDWN, alert, and in no acute distress. Breathing is unlabored. He is grossly oriented to person, place, and time. \par \par Right Hand: Vertical incision along the long and ring finger A1 pulleys are healing well. There are no signs of infection. Sutures were removed. Normal amount of postoperative edema and tenderness present. \par \par Left Hand: Incision is healing well. There are no signs of infection. Sutures were removed. Normal amount of postoperative edema and tenderness present.  [de-identified] : Benzoin and steri strips were applied over the incision sites. Massage the scars with vitamin E oil once the strips have fallen off. Gentle range of motion exercises were encouraged as well as returning to normal use of the hands to perform ADLs was advised to avoid stiffening. Patient was encouraged to increase use of the hand. Follow up in 6 weeks.

## 2020-07-27 ENCOUNTER — APPOINTMENT (OUTPATIENT)
Dept: ORTHOPEDIC SURGERY | Facility: CLINIC | Age: 75
End: 2020-07-27
Payer: MEDICARE

## 2020-07-27 PROCEDURE — 20550 NJX 1 TENDON SHEATH/LIGAMENT: CPT | Mod: 58,F7

## 2020-07-27 PROCEDURE — 99024 POSTOP FOLLOW-UP VISIT: CPT

## 2020-07-27 NOTE — HISTORY OF PRESENT ILLNESS
[de-identified] : s/p decompression of right long and ring trigger finger, decompression of left long trigger finger on 06/19/2020.  [de-identified] : The patient is a 74 year old male who returns for the 2nd postoperative visit after undergoing  decompression of right long and ring trigger finger, decompression of left long trigger finger at [facility]. The surgery was performed on 06/19/2020. The patient is recovering at home. He reports mild-to-moderate postoperative pain in the right hand and minimal pain in the left hand. The affected fingers in the right hand are stiff and swollen. He is unable to tolerate holding a steering wheel with the right. Patient has returned today stating that his finger has gotten worse since the surgery. He denies taking any pain medication. He denies lifting anything heavy or fishing. \par  [de-identified] : Patient is WDWN, alert, and in no acute distress. Breathing is unlabored. He is grossly oriented to person, place, and time. \par \par Right Hand: Vertical incision along the long and ring finger A1 pulleys are healing well. There are no signs of infection. Sutures were removed. Normal amount of postoperative edema and tenderness present. \par His finger has swollen and he is unable to make a tight fist. \par \par Left Hand: Incision is healing well. There are no signs of infection. Sutures were removed. Normal amount of postoperative edema and tenderness present.  [de-identified] : Benzoin and steri strips were applied over the incision sites. Massage the scars with vitamin E . Gentle range of motion exercises were encouraged as well as returning to normal use of the hands to perform ADLs was advised to avoid stiffening. Patient was encouraged to increase use of the hand. Follow up in 6 weeks. \par \par The underlying pathophysiology was reviewed with the patient. Treatment options were discussed including; observation, NSAIDS, analgesics, injection and surgical intervention. \par \par The patient wishes to proceed with a cortisone injection at this time. The skin was prepped with alcohol and sprayed with Ethyl Chloride. An injection of 0.5 cc 1% Lidocaine without epinephrine, 0.25 cc Kenalog 40mg, and 0.25 cc Dexamethasone was administered into the flexor tendon sheath of the right long finger. The patient tolerated the procedure well. Apply ice.\par \par  [de-identified] : No imaging done today.

## 2020-07-27 NOTE — ADDENDUM
[FreeTextEntry1] : I, Dunia Pimentel wrote this note acting as a scribe for Dr. Alfonso Gandara on Jul 27, 2020.\par

## 2020-07-27 NOTE — END OF VISIT
[FreeTextEntry3] : I, Alfonso Gandara MD, ordering physician, have read and attest that all the information, medical decision making and discharge instructions within are true and accurate.\par

## 2020-09-03 ENCOUNTER — APPOINTMENT (OUTPATIENT)
Dept: ORTHOPEDIC SURGERY | Facility: CLINIC | Age: 75
End: 2020-09-03
Payer: MEDICARE

## 2020-09-03 PROCEDURE — 20550 NJX 1 TENDON SHEATH/LIGAMENT: CPT | Mod: 58,F2

## 2020-09-03 PROCEDURE — 99024 POSTOP FOLLOW-UP VISIT: CPT

## 2020-09-03 NOTE — HISTORY OF PRESENT ILLNESS
[de-identified] : s/p decompression of right long and ring trigger finger, decompression of left long trigger finger on 06/19/2020.  [de-identified] : No imaging done today.  [de-identified] : Patient is WDWN, alert, and in no acute distress. Breathing is unlabored. He is grossly oriented to person, place, and time. \par \par Right Hand: Vertical incision along the long and ring finger A1 pulleys are healing well. There are no signs of infection. Sutures were removed. Normal amount of postoperative edema and tenderness present. \par His finger has swollen and he is unable to make a tight fist. \par \par Left Hand: Incision is healing well. There are no signs of infection. Sutures were removed. Normal amount of postoperative edema and tenderness present.  [de-identified] : The patient is a 74 year old male who returns for the 3rd postoperative visit after undergoing  decompression of right long and ring trigger finger, decompression of left long trigger finger at [facility]. The surgery was performed on 06/19/2020. The patient is recovering at home. He reports mild-to-moderate postoperative pain in the right hand and minimal pain in the left hand. The affected fingers in the right hand are stiff and swollen. He is unable to tolerate holding a steering wheel with the right. Patient has returned today stating that his finger has gotten worse since the surgery. He denies taking any pain medication. He denies lifting anything heavy or fishing. He returns today stating that he is experiencing pain in his left long finger. He states that he still has stiffness. He denies fishing or golfing. \par  [de-identified] : The patient wishes to proceed with a cortisone injection at this time. The skin was prepped with alcohol and sprayed with Ethyl Chloride. An injection of 0.5 cc 1% Lidocaine without epinephrine, 0.25 cc Kenalog 40 mg, and 0.25 cc Dexamethasone was administered into the left long finger. (1) The patient tolerated the procedure well. \par \par Apply ice. \par Follow up as needed.

## 2020-09-03 NOTE — ADDENDUM
[FreeTextEntry1] : I, Sarah Bautista wrote this note acting as a scribe for Dr. Alfonso Gandara on Sep 03, 2020.

## 2020-10-05 ENCOUNTER — APPOINTMENT (OUTPATIENT)
Dept: ORTHOPEDIC SURGERY | Facility: CLINIC | Age: 75
End: 2020-10-05
Payer: MEDICARE

## 2020-10-05 VITALS — BODY MASS INDEX: 34.36 KG/M2 | WEIGHT: 240 LBS | HEIGHT: 70 IN

## 2020-10-05 VITALS — TEMPERATURE: 97.9 F

## 2020-10-05 DIAGNOSIS — M47.817 SPONDYLOSIS W/OUT MYELOPATHY OR RADICULOPATHY, LUMBOSACRAL REGION: ICD-10-CM

## 2020-10-05 PROCEDURE — 99213 OFFICE O/P EST LOW 20 MIN: CPT

## 2020-10-05 RX ORDER — METHYLPREDNISOLONE 4 MG/1
4 TABLET ORAL
Qty: 21 | Refills: 0 | Status: ACTIVE | COMMUNITY
Start: 2020-10-02

## 2020-10-06 PROBLEM — M47.817 SPONDYLOSIS OF LUMBOSACRAL REGION WITHOUT MYELOPATHY OR RADICULOPATHY: Status: ACTIVE | Noted: 2020-05-08

## 2021-01-21 ENCOUNTER — APPOINTMENT (OUTPATIENT)
Dept: ORTHOPEDIC SURGERY | Facility: CLINIC | Age: 76
End: 2021-01-21
Payer: MEDICARE

## 2021-01-21 PROCEDURE — 99213 OFFICE O/P EST LOW 20 MIN: CPT | Mod: 25

## 2021-01-21 PROCEDURE — 20550 NJX 1 TENDON SHEATH/LIGAMENT: CPT | Mod: F2

## 2021-01-21 PROCEDURE — 99072 ADDL SUPL MATRL&STAF TM PHE: CPT

## 2021-01-21 NOTE — DISCUSSION/SUMMARY
[FreeTextEntry1] : The underlying pathophysiology was reviewed with the patient. Treatment options were discussed including; observation, NSAIDS, analgesics, bracing, injection(s), physical therapy, and surgical intervention. \par \par The patient wishes to proceed with a cortisone injection at this time. The skin was prepped with alcohol and sprayed with Ethyl Chloride. An injection of 0.5 cc 1% Lidocaine without epinephrine, 0.25 cc Kenalog 40 mg, and 0.25 cc Dexamethasone was administered into the left long finger. (2) The patient tolerated the procedure well. \par \par Apply ice. \par Follow up as needed.

## 2021-01-21 NOTE — PHYSICAL EXAM
[de-identified] : Patient is WDWN, alert, and in no acute distress. Breathing is unlabored. He is grossly oriented to person, place, and time. \par \par \par \par Left Hand: Incision is healed. There are no signs of infection.edema and tenderness present  in the middle finger over the A1 pulley [de-identified] : No imaging done today.

## 2021-01-21 NOTE — ADDENDUM
[FreeTextEntry1] : I, Sarah Bautista wrote this note acting as a scribe for Dr. Alfonso Gandara on Jan 21, 2021.

## 2021-01-21 NOTE — HISTORY OF PRESENT ILLNESS
[FreeTextEntry1] : Patient is a 75 year old male who presents after undergoing decompression of right long and ring trigger finger, decompression of left long trigger finger at [facility]. The surgery was performed on 06/19/2020.  He stated that he is experiencing pain in his left long finger. He states that he still has stiffness. He denied fishing or golfing. He states the the right hand experiences occasional pain. He states that the left middle finger has constant pain. He reports pain flexing and extending the finger. Denies fishing recently. \par  \par

## 2021-02-24 ENCOUNTER — EMERGENCY (EMERGENCY)
Facility: HOSPITAL | Age: 76
LOS: 1 days | Discharge: HOME | End: 2021-02-24
Attending: EMERGENCY MEDICINE | Admitting: EMERGENCY MEDICINE
Payer: MEDICARE

## 2021-02-24 VITALS
DIASTOLIC BLOOD PRESSURE: 89 MMHG | RESPIRATION RATE: 20 BRPM | SYSTOLIC BLOOD PRESSURE: 145 MMHG | WEIGHT: 235.01 LBS | HEART RATE: 78 BPM | OXYGEN SATURATION: 97 % | HEIGHT: 70 IN | TEMPERATURE: 98 F

## 2021-02-24 VITALS
OXYGEN SATURATION: 98 % | SYSTOLIC BLOOD PRESSURE: 140 MMHG | HEART RATE: 78 BPM | RESPIRATION RATE: 17 BRPM | DIASTOLIC BLOOD PRESSURE: 90 MMHG | TEMPERATURE: 98 F

## 2021-02-24 DIAGNOSIS — Z96.60 PRESENCE OF UNSPECIFIED ORTHOPEDIC JOINT IMPLANT: Chronic | ICD-10-CM

## 2021-02-24 DIAGNOSIS — Z98.89 OTHER SPECIFIED POSTPROCEDURAL STATES: Chronic | ICD-10-CM

## 2021-02-24 DIAGNOSIS — K43.2 INCISIONAL HERNIA WITHOUT OBSTRUCTION OR GANGRENE: Chronic | ICD-10-CM

## 2021-02-24 DIAGNOSIS — Z96.659 PRESENCE OF UNSPECIFIED ARTIFICIAL KNEE JOINT: Chronic | ICD-10-CM

## 2021-02-24 DIAGNOSIS — Z90.49 ACQUIRED ABSENCE OF OTHER SPECIFIED PARTS OF DIGESTIVE TRACT: Chronic | ICD-10-CM

## 2021-02-24 DIAGNOSIS — Z96.651 PRESENCE OF RIGHT ARTIFICIAL KNEE JOINT: Chronic | ICD-10-CM

## 2021-02-24 PROCEDURE — 71250 CT THORAX DX C-: CPT | Mod: 26

## 2021-02-24 PROCEDURE — 71250 CT THORAX DX C-: CPT

## 2021-02-24 PROCEDURE — 99284 EMERGENCY DEPT VISIT MOD MDM: CPT | Mod: 25

## 2021-02-24 PROCEDURE — 71101 X-RAY EXAM UNILAT RIBS/CHEST: CPT

## 2021-02-24 PROCEDURE — 73552 X-RAY EXAM OF FEMUR 2/>: CPT

## 2021-02-24 PROCEDURE — G1004: CPT

## 2021-02-24 PROCEDURE — 71101 X-RAY EXAM UNILAT RIBS/CHEST: CPT | Mod: 26

## 2021-02-24 PROCEDURE — 73552 X-RAY EXAM OF FEMUR 2/>: CPT | Mod: 26,LT

## 2021-02-24 PROCEDURE — 99284 EMERGENCY DEPT VISIT MOD MDM: CPT

## 2021-02-24 RX ORDER — LOSARTAN POTASSIUM 100 MG/1
1 TABLET, FILM COATED ORAL
Qty: 0 | Refills: 0 | DISCHARGE

## 2021-02-24 RX ORDER — AMLODIPINE BESYLATE 2.5 MG/1
1 TABLET ORAL
Qty: 0 | Refills: 0 | DISCHARGE

## 2021-02-24 RX ORDER — TRIAMTERENE/HYDROCHLOROTHIAZID 75 MG-50MG
0 TABLET ORAL
Qty: 0 | Refills: 0 | DISCHARGE

## 2021-02-24 RX ORDER — DULOXETINE HYDROCHLORIDE 30 MG/1
0 CAPSULE, DELAYED RELEASE ORAL
Qty: 0 | Refills: 0 | DISCHARGE

## 2021-02-24 RX ORDER — PANTOPRAZOLE SODIUM 20 MG/1
1 TABLET, DELAYED RELEASE ORAL
Qty: 0 | Refills: 0 | DISCHARGE

## 2021-02-24 RX ORDER — LEVOTHYROXINE SODIUM 125 MCG
1 TABLET ORAL
Qty: 0 | Refills: 0 | DISCHARGE

## 2021-02-24 RX ORDER — ACETAMINOPHEN 500 MG
975 TABLET ORAL ONCE
Refills: 0 | Status: COMPLETED | OUTPATIENT
Start: 2021-02-24 | End: 2021-02-24

## 2021-02-24 RX ORDER — PROPRANOLOL HCL 160 MG
1 CAPSULE, EXTENDED RELEASE 24HR ORAL
Qty: 0 | Refills: 0 | DISCHARGE

## 2021-02-24 RX ORDER — UBROGEPANT 100 MG/1
1 TABLET ORAL
Qty: 0 | Refills: 0 | DISCHARGE

## 2021-02-24 RX ORDER — TAMSULOSIN HYDROCHLORIDE 0.4 MG/1
1 CAPSULE ORAL
Qty: 0 | Refills: 0 | DISCHARGE

## 2021-02-24 RX ADMIN — Medication 975 MILLIGRAM(S): at 20:10

## 2021-02-24 NOTE — ED PROVIDER NOTE - CARE PROVIDER_API CALL
Tyrese Mayer J  INTERNAL MEDICINE  [address], [address]  [place] [address]  Phone: [phone]  Fax: [phone]  Follow Up Time: 1-3 Days

## 2021-02-24 NOTE — ED PROVIDER NOTE - NSFOLLOWUPINSTRUCTIONS_ED_ALL_ED_FT
Rib Contusion      A rib contusion is a deep bruise on your rib area. Contusions are the result of a blunt trauma that causes bleeding and injury to the tissues under the skin. A rib contusion may involve bruising of the ribs and of the skin and muscles in the area. The skin over the contusion may turn blue, purple, or yellow. Minor injuries will give you a painless contusion. More severe contusions may stay painful and swollen for a few weeks.      What are the causes?    This condition is usually caused by a blow, trauma, or direct force to an area of the body. This often occurs while playing contact sports.      What are the signs or symptoms?  Symptoms of this condition include:  •Swelling and redness of the injured area.      •Discoloration of the injured area.      •Tenderness and soreness of the injured area.      •Pain with or without movement.        How is this diagnosed?  This condition may be diagnosed based on:  •Your symptoms and medical history.      •A physical exam.      •Imaging tests—such as an X-ray, CT scan, or MRI—to determine if there were internal injuries or broken bones (fractures).        How is this treated?  This condition may be treated with:  •Rest. This is often the best treatment for a rib contusion.      •Icing. This reduces swelling and inflammation.      •Deep-breathing exercises. These may be recommended to reduce the risk for lung collapse and pneumonia.      •Medicines. Over-the-counter or prescription medicines may be given to control pain.      •Injection of a numbing medicine around the nerve near your injury (nerve block).        Follow these instructions at home:                  Medicines     •Take over-the-counter and prescription medicines only as told by your health care provider.      • Do not drive or use heavy machinery while taking prescription pain medicine.    •If you are taking prescription pain medicine, take actions to prevent or treat constipation. Your health care provider may recommend that you:   •Drink enough fluid to keep your urine pale yellow.       •Eat foods that are high in fiber, such as fresh fruits and vegetables, whole grains, and beans.       •Limit foods that are high in fat and processed sugars, such as fried or sweet foods.       •Take an over-the-counter or prescription medicine for constipation.        Managing pain, stiffness, and swelling   •If directed, put ice on the injured area:  •Put ice in a plastic bag.      •Place a towel between your skin and the bag.      •Leave the ice on for 20 minutes, 2–3 times a day.        •Rest the injured area. Avoid strenuous activity and any activities or movements that cause pain. Be careful during activities and avoid bumping the injured area.      • Do not lift anything that is heavier than 5 lb (2.3 kg), or the limit that you are told, until your health care provider says that it is safe.      General instructions     • Do not use any products that contain nicotine or tobacco, such as cigarettes and e-cigarettes. These can delay healing. If you need help quitting, ask your health care provider.      •Do deep-breathing exercises as told by your health care provider.      •If you were given an incentive spirometer, use it every 1–2 hours while you are awake, or as recommended by your health care provider. This device measures how well you are filling your lungs with each breath.      •Keep all follow-up visits as told by your health care provider. This is important.        Contact a health care provider if you have:    •Increased bruising or swelling.      •Pain that is not controlled with treatment.      •A fever.        Get help right away if you:    •Have difficulty breathing or shortness of breath.      •Develop a continual cough or you cough up thick or bloody sputum.      •Feel nauseous or you vomit.      •Have pain in your abdomen.        Summary    •A rib contusion is a deep bruise on your rib area. Contusions are the result of a blunt trauma that causes bleeding and injury to the tissues under the skin.      •The skin overlying the contusion may turn blue, purple, or yellow. Minor injuries may give you a painless contusion. More severe contusions may stay painful and swollen for a few weeks.      •Rest the injured area. Avoid strenuous activity and any activities or movements that cause pain.      This information is not intended to replace advice given to you by your health care provider. Make sure you discuss any questions you have with your health care provider.

## 2021-02-24 NOTE — ED PROVIDER NOTE - OBJECTIVE STATEMENT
74 yo male with h/o CAD with stents (on baby aspirin) and prostate cancer in remission presents to the ED s/p fall downstairs today at 4 pm. Patient explains he was carrying shoe boxes, missed the last 2 steps and fell onto his left side. no head trauma or LOC. Patient c/o left anterior upper rib pain and left leg pain. Patient ambulating since fall. Chest wall pain worse with movement and cough. Denies fever, chills, chest pain, sob, abd pain, N/V, UE/LE weakness or paresthesias.

## 2021-02-24 NOTE — ED ADULT NURSE NOTE - PSH
Hernia, incisional  s/p colon resection  S/P carpal tunnel release  left with ulnar nerve transposition 2009  S/P carpal tunnel release  right with ulnar nerve transposition 2009  S/P colon resection  11/1994 [facility] colostomy , 2/1995 colostomy take down  S/P colon resection  with temporary colostomy for benign mass 1993  S/P colonoscopy  12/4/2019 small benign  polyp  S/P endoscopy  Spring  2019  S/P knee replacement  left 4/2014 , right 2015  S/P tonsillectomy and adenoidectomy  as child  S/P total hip arthroplasty  left 2013  S/P total hip arthroplasty  right 2012  S/P total knee arthroplasty, right  2011

## 2021-02-24 NOTE — ED PROVIDER NOTE - CLINICAL SUMMARY MEDICAL DECISION MAKING FREE TEXT BOX
74 yo male with h/o CAD with stents (on baby aspirin) and prostate cancer in remission presents to the ED s/p fall downstairs today at 4 pm. Patient explains he was carrying shoe boxes, missed the last 2 steps and fell onto his left side. no head trauma or LOC. Patient c/o left anterior upper rib pain and left leg pain. Patient ambulating since fall. Chest wall pain worse with movement and cough. Denies fever, chills, chest pain, sob, abd pain, N/V, UE/LE weakness or paresthesias. PE: as above. A/P left sided rib xray, left femur xray, tylenol, reassess.

## 2021-02-24 NOTE — ED PROVIDER NOTE - PATIENT PORTAL LINK FT
You can access the FollowMyHealth Patient Portal offered by [facility] by registering at the following website: [email or web address]. By joining [employer]’s FollowMyHealth portal, you will also be able to view your health information using other applications (apps) compatible with our system.
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
## 2021-02-24 NOTE — ED PROVIDER NOTE - PSH
Hernia, incisional  s/p colon resection  S/P carpal tunnel release  left with ulnar nerve transposition 2009  S/P carpal tunnel release  right with ulnar nerve transposition 2009  S/P colon resection  11/1994 [facility] colostomy , 2/1995 colostomy take down  S/P colon resection  with temporary colostomy for benign mass 1993  S/P colonoscopy  12/4/2019 small benign  polyp  S/P endoscopy  Spring  2019  S/P knee replacement  left 4/2014 , right 2015  S/P tonsillectomy and adenoidectomy  as child  S/P total hip arthroplasty  left 2013  S/P total hip arthroplasty  right 2012  S/P total knee arthroplasty, right  2011

## 2021-02-24 NOTE — ED PROVIDER NOTE - ATTENDING CONTRIBUTION TO CARE
75 y.o. M states was walking down stairs to a landing, thought he was at landing, but had one more stair, tripped, fell on left side, mostly c/o left side rib and left thigh pain, states no LOC, that he was very aware of head position and did not hit head, no neck/back pain, occurred this afternoon, given persistent pain came to ED, walked in with cane, states he is supposed to use it all the time, but in the house does not use it, no other complaints; on exam pt wd, wn, appears in discomfort with movement; HEENT PERRL, nc/at; msk - no deformities, FROM throughout, no spinal ttp, +ttp over left anterior thigh; cv - rrr, 2+ pulses; resp - cta, no w/r/r, +ttp left anterior/mid chest wall, no crepitus; abd - soft, nt, nd; A/P - xr left femur negative, cxr possible small effusion, no fx noted, will ct to ensure no traumatic finding/hemothorax

## 2021-02-24 NOTE — ED PROVIDER NOTE - CARE PLAN
Principal Discharge DX:	Rib contusion, left, initial encounter  Secondary Diagnosis:	Contusion of left thigh, initial encounter

## 2021-02-24 NOTE — ED ADULT NURSE NOTE - OBJECTIVE STATEMENT
pt states he tripped and fell down 1 step in his house around 4pm, states he had dinner after before coming, states he landed on his left side, now has L anterior chest wall pain and L upper leg pain. pt denies head injury or neck pain. abd soft nontender. MAEx4, neuro intact, no obvious sings of deformity or trauma noted. pt ambulated in ED

## 2021-02-24 NOTE — ED PROVIDER NOTE - MUSCULOSKELETAL MINIMAL EXAM
+ TTP left anterior thigh. no pelvic or hip tenderness with FROM. + left upper anterior chest wall tenderness. no step off or crepitus. no midline vertebral tenderness. FROM of all extremities., radial and dp pulses equal and intact bilaterally.

## 2021-04-02 NOTE — ED ADULT NURSE NOTE - NSFALLRSKOUTCOME_ED_ALL_ED
Pt called to request rx refills for:  Atorvastatin  Sertraline  Lisinopril  amlodipine    Sent to [employer] mail service      Pt is scheduled to establish with dr. ge 4/20/21  
Fall with Harm Risk

## 2021-05-04 ENCOUNTER — NON-APPOINTMENT (OUTPATIENT)
Age: 76
End: 2021-05-04

## 2021-05-06 ENCOUNTER — APPOINTMENT (OUTPATIENT)
Dept: ORTHOPEDIC SURGERY | Facility: CLINIC | Age: 76
End: 2021-05-06
Payer: MEDICARE

## 2021-05-06 VITALS — HEIGHT: 70 IN | BODY MASS INDEX: 32.21 KG/M2 | WEIGHT: 225 LBS

## 2021-05-06 DIAGNOSIS — M65.341 TRIGGER FINGER, RIGHT RING FINGER: ICD-10-CM

## 2021-05-06 DIAGNOSIS — M25.531 PAIN IN RIGHT WRIST: ICD-10-CM

## 2021-05-06 DIAGNOSIS — M19.031 PRIMARY OSTEOARTHRITIS, RIGHT WRIST: ICD-10-CM

## 2021-05-06 PROCEDURE — 99213 OFFICE O/P EST LOW 20 MIN: CPT | Mod: 25

## 2021-05-06 PROCEDURE — 73110 X-RAY EXAM OF WRIST: CPT | Mod: RT

## 2021-05-06 PROCEDURE — 20550 NJX 1 TENDON SHEATH/LIGAMENT: CPT | Mod: 59,F7

## 2021-05-06 PROCEDURE — 99072 ADDL SUPL MATRL&STAF TM PHE: CPT

## 2021-05-06 PROCEDURE — 20605 DRAIN/INJ JOINT/BURSA W/O US: CPT | Mod: RT

## 2021-05-06 NOTE — END OF VISIT
[FreeTextEntry3] : All medical record entries made by the Scribe were at my,  Dr. Alfonso Gandara MD., direction and personally dictated by me on 05/06/2021. I have personally reviewed the chart and agree that the record accurately reflects my personal performance of the history, physical exam, assessment and plan.\par \par

## 2021-05-06 NOTE — HISTORY OF PRESENT ILLNESS
[FreeTextEntry1] : Patient is a 75 year old male who presents after undergoing decompression of right long and ring trigger finger, decompression of left long trigger finger at [facility]. The surgery was performed on 06/19/2020.  He states that the right hand experiences occasional pain. Patient reports that some days his pain in his right long finger is as low as 3/10 and other days it is a 9/10. He denied fishing or golfing. He states that the left middle finger pain has subsided since receiving a cortisone injection on 1/21/21. He reports pain flexing and extending the finger.

## 2021-05-06 NOTE — ADDENDUM
[FreeTextEntry1] : I, Mackenzie Mendez wrote this note acting as a scribe for Dr. Alfonso Gandara on May 06, 2021.\par \par

## 2021-05-06 NOTE — PHYSICAL EXAM
[de-identified] : Patient is WDWN, alert, and in no acute distress. Breathing is unlabored. He is grossly oriented to person, place, and time. \par \par Right Wrist:\par Tenderness over radiocarpal joint\par Discomfort with rotation. \par ROM is full\par \par Right hand: \par There is A1 pulley tenderness in the long finger. Full arc of motion in the fingers, and all intrinsic and extrinsic hand  muscles 5/5. No joint instability on provocative testing, sensation is intact to light touch, and no skin lesions or  discoloration."		 [de-identified] : AP, lateral and oblique views of the right wrist were obtained today and revealed arthritis in the radiocarpal joint.

## 2021-07-01 ENCOUNTER — APPOINTMENT (OUTPATIENT)
Dept: ORTHOPEDIC SURGERY | Facility: CLINIC | Age: 76
End: 2021-07-01
Payer: MEDICARE

## 2021-07-01 PROCEDURE — 99214 OFFICE O/P EST MOD 30 MIN: CPT | Mod: 25

## 2021-07-01 PROCEDURE — 20550 NJX 1 TENDON SHEATH/LIGAMENT: CPT | Mod: F2

## 2021-07-03 NOTE — HISTORY OF PRESENT ILLNESS
[FreeTextEntry1] : KLEVER BRADLEY is a 75 year male presents for follow-up evaluation of left middle finger trigger finger. Patient states the finger locks and triggers. He denies numbness and tingling. There is tenderness over the A1 pulley. He has difficulty with gripping and twisting. He presents in office today requesting a cortisone injection into the finger.

## 2021-07-03 NOTE — PHYSICAL EXAM
[de-identified] : Patient is WDWN, alert, and in no acute distress. Breathing is unlabored. He is grossly oriented to person, place, \par and time.\par \par Left hand:\par There is A1 pulley tenderness in the middle finger. Full arc of motion in the fingers, and all intrinsic and extrinsic hand \par muscles 5/5. No joint instability on provocative testing, sensation is intact to light touch, and no skin lesions or \par discoloration.  [de-identified] : no imaging today

## 2021-07-03 NOTE — DISCUSSION/SUMMARY
[FreeTextEntry1] : The underlying pathophysiology was reviewed with the patient. XR films were reviewed with the patient. Discussed at length the nature of the patient’s condition. The left long finger symptoms appear secondary to trigger finger. \par \par The patient wishes to proceed with a cortisone injection at this time (1). The skin was prepped with alcohol and sprayed with Ethyl Chloride. An injection of 0.5 cc 1% Lidocaine without epinephrine, 0.25 cc Kenalog 40mg, and 0.25 cc Dexamethasone was administered into the flexor tendon sheath of the left middle finger. The patient tolerated the procedure well. Apply ice. \par \par Patient can continue activities as tolerated. All questions answered, understanding verbalized. Patient in agreement with plan of care. Follow up as needed.

## 2021-07-03 NOTE — END OF VISIT
[FreeTextEntry3] : All medical record entries made by the Scribe were at my,  Dr. Alfonso Gandara MD., direction and personally dictated by me on 07/01/2021. I have personally reviewed the chart and agree that the record accurately reflects my personal performance of the history, physical exam, assessment and plan.\par \par

## 2021-07-03 NOTE — ADDENDUM
[FreeTextEntry1] : I, Mackenzie Mendez wrote this note acting as a scribe for Dr. Alfonso Gandara on Jul 01, 2021.\par \par

## 2021-07-20 NOTE — ED ADULT NURSE NOTE - FINAL NURSING ELECTRONIC SIGNATURE
SURGERY PROGRESS NOTE    SUBJECTIVE / 24H EVENTS:  Patient seen and examined on morning rounds. No acute events overnight. Patient reports stool more formed since beginning soft diet. Reports he had a long conversation with NSGY regarding colostomy but would like to defer for now. Denies abdominal pain, nausea/vomiting.       OBJECTIVE:  VITAL SIGNS:  T(C): 36.6 (07-20-21 @ 07:00), Max: 37 (07-19-21 @ 19:00)  HR: 68 (07-20-21 @ 07:00) (64 - 82)  BP: 119/76 (07-20-21 @ 07:00) (88/57 - 131/63)  RR: 17 (07-20-21 @ 07:00) (17 - 27)  SpO2: 99% (07-20-21 @ 07:00) (83% - 100%)  Daily     Daily       PHYSICAL EXAM:  Gen: NAD  LS: Respirations unlabored   GI: Soft. Nontender. Nondistended. Aquacel dressing in place over midline wound  Ext: Warm, well perfused      07-19-21 @ 07:01  -  07-20-21 @ 07:00  --------------------------------------------------------  IN:    Heparin: 354 mL    Oral Fluid: 1440 mL    Sodium Chloride 0.9% Bolus: 500 mL  Total IN: 2294 mL    OUT:    Bulb (mL): 60 mL    Drain (mL): 45 mL    Drain (mL): 50 mL    Indwelling Catheter - Urethral (mL): 3275 mL  Total OUT: 3430 mL    Total NET: -1136 mL      07-20-21 @ 07:01  -  07-20-21 @ 11:50  --------------------------------------------------------  IN:    Heparin: 43.5 mL    Oral Fluid: 240 mL  Total IN: 283.5 mL    OUT:    Bulb (mL): 10 mL    Drain (mL): 5 mL    Drain (mL): 10 mL    Indwelling Catheter - Urethral (mL): 375 mL  Total OUT: 400 mL    Total NET: -116.5 mL          LAB VALUES:  07-20    133<L>  |  99  |  12  ----------------------------<  95  4.2   |  21<L>  |  0.80    Ca    9.2      20 Jul 2021 06:49  Phos  3.9     07-20  Mg     2.1     07-20                                 9.9    12.76 )-----------( 431      ( 18 Jul 2021 23:38 )             31.0       PTT - ( 20 Jul 2021 06:52 )  PTT:58.6 sec            MICROBIOLOGY:      RADIOLOGY:        MEDICATIONS  (STANDING):  calcium carbonate 1250 mG  + Vitamin D (OsCal 500 + D) 1 Tablet(s) Oral daily  gabapentin 200 milliGRAM(s) Oral every 8 hours  heparin  Infusion 1000 Unit(s)/Hr (14.5 mL/Hr) IV Continuous <Continuous>  levoFLOXacin  Tablet 750 milliGRAM(s) Oral every 24 hours  levothyroxine 137 MICROGram(s) Oral daily  multivitamin 1 Tablet(s) Oral daily  pantoprazole    Tablet 40 milliGRAM(s) Oral before breakfast  polyethylene glycol 3350 17 Gram(s) Oral every 12 hours  senna 2 Tablet(s) Oral at bedtime    MEDICATIONS  (PRN):  acetaminophen   Tablet .. 650 milliGRAM(s) Oral every 6 hours PRN Temp greater or equal to 38C (100.4F), Mild Pain (1 - 3)  aluminum hydroxide/magnesium hydroxide/simethicone Suspension 30 milliLiter(s) Oral every 4 hours PRN Dyspepsia  bisacodyl Suppository 10 milliGRAM(s) Rectal daily PRN Constipation  ondansetron Injectable 4 milliGRAM(s) IV Push every 6 hours PRN Nausea        24-Feb-2021 21:17

## 2021-09-02 ENCOUNTER — APPOINTMENT (OUTPATIENT)
Dept: ORTHOPEDIC SURGERY | Facility: CLINIC | Age: 76
End: 2021-09-02
Payer: MEDICARE

## 2021-09-02 VITALS — BODY MASS INDEX: 31.78 KG/M2 | WEIGHT: 222 LBS | HEIGHT: 70 IN

## 2021-09-02 PROCEDURE — 20550 NJX 1 TENDON SHEATH/LIGAMENT: CPT | Mod: 59,F2

## 2021-09-02 PROCEDURE — 99213 OFFICE O/P EST LOW 20 MIN: CPT | Mod: 25

## 2021-09-03 NOTE — DISCUSSION/SUMMARY
[FreeTextEntry1] : The underlying pathophysiology was reviewed with the patient. Discussed at length the nature of the patient’s condition. The bilateral long finger symptoms appear secondary to trigger finger.\par \par The patient wishes to proceed with a cortisone injection at this time (1). The skin was prepped with alcohol and sprayed with Ethyl Chloride. An injection of 0.5 cc 1% Lidocaine without epinephrine, 0.25 cc Kenalog 40mg, and 0.25 cc Dexamethasone was administered into the flexor tendon sheath of the right long fingers. The patient tolerated the procedure well. Apply ice. \par \par The patient wishes to proceed with a cortisone injection at this time (3). The skin was prepped with alcohol and sprayed with Ethyl Chloride. An injection of 0.5 cc 1% Lidocaine without epinephrine, 0.25 cc Kenalog 40mg, and 0.25 cc Dexamethasone was administered into the flexor tendon sheath of the left long fingers. The patient tolerated the procedure well. Apply ice. \par \par Patient can continue activities as tolerated. All questions answered, understanding verbalized. Patient in agreement with plan of care. Follow up as needed.

## 2021-09-03 NOTE — HISTORY OF PRESENT ILLNESS
[FreeTextEntry1] : KLEVER BRADLEY is a 75 year male presents for follow-up evaluation of bilateral middle finger pain. He has a history of triggering of the left middle finger for which he has received an injection for on 7/1/21. He presents today with recurrent symptoms as well as triggering of the right middle finger. THe fingers lock and get stuck in a flexed position. There is tenderness over the A1 pulley of each finger. He is requesting bilateral long finger cortisone injection today.

## 2021-09-03 NOTE — END OF VISIT
[FreeTextEntry3] : All medical record entries made by the Scribe were at my,  Dr. Alfonso Gandara MD., direction and personally dictated by me on 09/02/2021. I have personally reviewed the chart and agree that the record accurately reflects my personal performance of the history, physical exam, assessment and plan.

## 2021-09-03 NOTE — ADDENDUM
[FreeTextEntry1] : I, Mackenzie Mendez wrote this note acting as a scribe for Dr. Alfonso Gandara on Sep 02, 2021.

## 2021-09-03 NOTE — PHYSICAL EXAM
[de-identified] : Patient is WDWN, alert, and in no acute distress. Breathing is unlabored. He is grossly oriented to person, place, and time.\par \par Right hand: \par There is A1 pulley tenderness in the long finger. Full arc of motion in the fingers, and all intrinsic and extrinsic hand muscles 5/5. No joint instability on provocative testing, sensation is intact to light touch, and no skin lesions or discoloration. \par \par Left hand: \par There is A1 pulley tenderness in the long finger. Full arc of motion in the fingers, and all intrinsic and extrinsic hand muscles 5/5. No joint instability on provocative testing, sensation is intact to light touch, and no skin lesions or discoloration. \par  [de-identified] : no imaging today

## 2021-12-27 NOTE — ED ADULT NURSE NOTE - BEFAST SPEECH SLURRED
Quality 154 Part A: Falls: Risk Assessment (Should Be Reported With Measure 155.): Falls risk assessment completed and documented in the past 12 months. Quality 226: Preventive Care And Screening: Tobacco Use: Screening And Cessation Intervention: Patient screened for tobacco and never smoked Quality 110: Preventive Care And Screening: Influenza Immunization: Influenza Immunization Administered during Influenza season Detail Level: Detailed Quality 431: Preventive Care And Screening: Unhealthy Alcohol Use - Screening: Patient screened for unhealthy alcohol use using a single question and scores less than 2 times per year Quality 131: Pain Assessment And Follow-Up: Pain assessment using a standardized tool is documented as negative, no follow-up plan required Quality 111:Pneumonia Vaccination Status For Older Adults: Pneumococcal Vaccination Administered Quality 154 Part B: Falls: Risk Screening (Should Be Reported With Measure 155.): Patient screened for future fall risk; documentation of no falls in the past year or only one fall without injury in the past year Quality 47: Advance Care Plan: Advance Care Planning discussed and documented; advance care plan or surrogate decision maker documented in the medical record. Quality 155 (Denominator): Falls Plan Of Care: Plan of Care not Documented, Reason not Otherwise Specified No

## 2022-03-10 ENCOUNTER — APPOINTMENT (OUTPATIENT)
Dept: ORTHOPEDIC SURGERY | Facility: CLINIC | Age: 77
End: 2022-03-10
Payer: MEDICARE

## 2022-03-10 PROCEDURE — 99213 OFFICE O/P EST LOW 20 MIN: CPT | Mod: 25

## 2022-03-10 PROCEDURE — 20550 NJX 1 TENDON SHEATH/LIGAMENT: CPT | Mod: 59,F7

## 2022-03-11 NOTE — ADDENDUM
[FreeTextEntry1] : I, Mackenzie Mendez wrote this note acting as a scribe for Dr. Alfonso Gandara on Mar 10, 2022.

## 2022-03-11 NOTE — PHYSICAL EXAM
[de-identified] : Patient is WDWN, alert, and in no acute distress. Breathing is unlabored. He is grossly oriented to person, place, and time.\par \par Right hand: \par There is A1 pulley tenderness in the long finger. Full arc of motion in the fingers, and all intrinsic and extrinsic hand muscles 5/5. No joint instability on provocative testing, sensation is intact to light touch, and no skin lesions or discoloration. \par \par Left hand: \par There is A1 pulley tenderness in the long finger. Full arc of motion in the fingers, and all intrinsic and extrinsic hand muscles 5/5. No joint instability on provocative testing, sensation is intact to light touch, and no skin lesions or discoloration.  [de-identified] : no imaging today

## 2022-03-11 NOTE — HISTORY OF PRESENT ILLNESS
[FreeTextEntry1] : KLEVER BRADLEY is a 75 year male presents for follow-up evaluation of bilateral middle finger pain. He has a history of triggering of the left middle finger for which he has received an injection for on 7/1/21 and 9/2/21. He presents today with recurrent symptoms as well as triggering of the right middle finger. The fingers lock and get stuck in a flexed position. There is tenderness over the A1 pulley of each finger. He is requesting bilateral long finger cortisone injection today.

## 2022-03-11 NOTE — END OF VISIT
[FreeTextEntry3] : All medical record entries made by the Scribe were at my,  Dr. Alfonso Gandara MD., direction and personally dictated by me on 03/10/2022. I have personally reviewed the chart and agree that the record accurately reflects my personal performance of the history, physical exam, assessment and plan.

## 2022-06-22 ENCOUNTER — APPOINTMENT (OUTPATIENT)
Dept: ORTHOPEDIC SURGERY | Facility: CLINIC | Age: 77
End: 2022-06-22
Payer: MEDICARE

## 2022-06-22 ENCOUNTER — NON-APPOINTMENT (OUTPATIENT)
Age: 77
End: 2022-06-22

## 2022-06-22 VITALS
BODY MASS INDEX: 32.35 KG/M2 | SYSTOLIC BLOOD PRESSURE: 143 MMHG | HEART RATE: 88 BPM | WEIGHT: 226 LBS | DIASTOLIC BLOOD PRESSURE: 92 MMHG | HEIGHT: 70 IN

## 2022-06-22 DIAGNOSIS — M47.27 OTHER SPONDYLOSIS WITH RADICULOPATHY, LUMBOSACRAL REGION: ICD-10-CM

## 2022-06-22 DIAGNOSIS — Z98.890 OTHER SPECIFIED POSTPROCEDURAL STATES: ICD-10-CM

## 2022-06-22 PROCEDURE — 72110 X-RAY EXAM L-2 SPINE 4/>VWS: CPT

## 2022-06-22 PROCEDURE — 99214 OFFICE O/P EST MOD 30 MIN: CPT

## 2022-06-22 PROCEDURE — 72170 X-RAY EXAM OF PELVIS: CPT

## 2022-06-22 RX ORDER — DICLOFENAC SODIUM 1% 10 MG/G
1 GEL TOPICAL
Qty: 200 | Refills: 0 | Status: ACTIVE | COMMUNITY
Start: 2022-04-13

## 2022-06-22 RX ORDER — CARVEDILOL 6.25 MG/1
6.25 TABLET, FILM COATED ORAL
Qty: 180 | Refills: 0 | Status: ACTIVE | COMMUNITY
Start: 2022-06-04

## 2022-06-22 RX ORDER — SODIUM SULFATE, POTASSIUM SULFATE, MAGNESIUM SULFATE 17.5; 3.13; 1.6 G/ML; G/ML; G/ML
17.5-3.13-1.6 SOLUTION, CONCENTRATE ORAL
Qty: 354 | Refills: 0 | Status: ACTIVE | COMMUNITY
Start: 2022-06-03

## 2022-06-23 ENCOUNTER — OUTPATIENT (OUTPATIENT)
Dept: OUTPATIENT SERVICES | Facility: HOSPITAL | Age: 77
LOS: 1 days | End: 2022-06-23
Payer: MEDICARE

## 2022-06-23 ENCOUNTER — APPOINTMENT (OUTPATIENT)
Dept: MRI IMAGING | Facility: CLINIC | Age: 77
End: 2022-06-23
Payer: MEDICARE

## 2022-06-23 DIAGNOSIS — Z98.89 OTHER SPECIFIED POSTPROCEDURAL STATES: Chronic | ICD-10-CM

## 2022-06-23 DIAGNOSIS — Z96.651 PRESENCE OF RIGHT ARTIFICIAL KNEE JOINT: Chronic | ICD-10-CM

## 2022-06-23 DIAGNOSIS — Z90.49 ACQUIRED ABSENCE OF OTHER SPECIFIED PARTS OF DIGESTIVE TRACT: Chronic | ICD-10-CM

## 2022-06-23 DIAGNOSIS — Z96.60 PRESENCE OF UNSPECIFIED ORTHOPEDIC JOINT IMPLANT: Chronic | ICD-10-CM

## 2022-06-23 DIAGNOSIS — Z96.659 PRESENCE OF UNSPECIFIED ARTIFICIAL KNEE JOINT: Chronic | ICD-10-CM

## 2022-06-23 DIAGNOSIS — K43.2 INCISIONAL HERNIA WITHOUT OBSTRUCTION OR GANGRENE: Chronic | ICD-10-CM

## 2022-06-23 DIAGNOSIS — Z98.890 OTHER SPECIFIED POSTPROCEDURAL STATES: ICD-10-CM

## 2022-06-23 PROCEDURE — 72149 MRI LUMBAR SPINE W/DYE: CPT

## 2022-06-23 PROCEDURE — 72149 MRI LUMBAR SPINE W/DYE: CPT | Mod: 26

## 2022-06-23 PROCEDURE — A9585: CPT

## 2022-06-29 ENCOUNTER — APPOINTMENT (OUTPATIENT)
Dept: ORTHOPEDIC SURGERY | Facility: CLINIC | Age: 77
End: 2022-06-29
Payer: MEDICARE

## 2022-06-29 VITALS — SYSTOLIC BLOOD PRESSURE: 142 MMHG | DIASTOLIC BLOOD PRESSURE: 94 MMHG | HEART RATE: 67 BPM

## 2022-06-29 PROCEDURE — 99214 OFFICE O/P EST MOD 30 MIN: CPT

## 2022-06-29 RX ORDER — CARVEDILOL 12.5 MG/1
12.5 TABLET, FILM COATED ORAL
Qty: 180 | Refills: 0 | Status: ACTIVE | COMMUNITY
Start: 2022-06-22

## 2022-08-01 ENCOUNTER — APPOINTMENT (OUTPATIENT)
Dept: ORTHOPEDIC SURGERY | Facility: CLINIC | Age: 77
End: 2022-08-01

## 2022-08-01 VITALS — BODY MASS INDEX: 32.21 KG/M2 | HEIGHT: 70 IN | WEIGHT: 225 LBS

## 2022-08-01 DIAGNOSIS — M65.331 TRIGGER FINGER, RIGHT MIDDLE FINGER: ICD-10-CM

## 2022-08-01 DIAGNOSIS — M65.332 TRIGGER FINGER, LEFT MIDDLE FINGER: ICD-10-CM

## 2022-08-01 PROCEDURE — 20550 NJX 1 TENDON SHEATH/LIGAMENT: CPT | Mod: 50

## 2022-08-01 PROCEDURE — 99213 OFFICE O/P EST LOW 20 MIN: CPT | Mod: 25

## 2022-08-02 NOTE — HISTORY OF PRESENT ILLNESS
[Right] : right hand dominant [FreeTextEntry1] : Patient is a 76 year old male who presents with complaints of bilateral middle finger pain and triggering. He has received 2 injections at the right long finger and a total of 4 at the left long finger. Most recently, both were injected on 3/10/22 at the time of his last office visit. He returns on 8/1/22 with recurrence of his symptoms.

## 2022-08-02 NOTE — PHYSICAL EXAM
[de-identified] : Patient is WDWN, alert, and in no acute distress. Breathing is unlabored. He is grossly oriented to person, place, and time.\par \par Right hand: \par There is A1 pulley tenderness in the long finger. Full arc of motion in the fingers, and all intrinsic and extrinsic hand muscles 5/5. No joint instability on provocative testing, sensation is intact to light touch, and no skin lesions or discoloration. \par \par Left hand: \par There is A1 pulley tenderness in the long finger. Full arc of motion in the fingers, and all intrinsic and extrinsic hand muscles 5/5. No joint instability on provocative testing, sensation is intact to light touch, and no skin lesions or discoloration.  [de-identified] : no imaging today

## 2022-08-02 NOTE — END OF VISIT
[FreeTextEntry3] : All medical record entries made by the Scribe were at my,  Dr. Alfonso Gandara MD., direction and personally dictated by me on 08/01/2022. I have personally reviewed the chart and agree that the record accurately reflects my personal performance of the history, physical exam, assessment and plan.

## 2022-08-02 NOTE — ADDENDUM
[FreeTextEntry1] : I, Mackenzie Mendez wrote this note acting as a scribe for Dr. Alfonso Gandara on Aug 01, 2022.

## 2022-08-02 NOTE — DISCUSSION/SUMMARY
[FreeTextEntry1] : The underlying pathophysiology was reviewed with the patient. Discussed at length the nature of the patient’s condition. The bilateral long finger symptoms appear secondary to trigger finger.\par \par The patient wishes to proceed with a cortisone injection at this time (3). The skin was prepped with alcohol and sprayed with Ethyl Chloride. An injection of 0.5 cc 1% Lidocaine without epinephrine, 0.25 cc Kenalog 40mg, and 0.25 cc Dexamethasone was administered into the flexor tendon sheath of the right long fingers. The patient tolerated the procedure well. Apply ice. \par \par The patient wishes to proceed with a cortisone injection at this time (5). The skin was prepped with alcohol and sprayed with Ethyl Chloride. An injection of 0.5 cc 1% Lidocaine without epinephrine, 0.25 cc Kenalog 40mg, and 0.25 cc Dexamethasone was administered into the flexor tendon sheath of the left long fingers. The patient tolerated the procedure well. Apply ice. \par \par All questions answered, understanding verbalized. Patient in agreement with plan of care. Follow up as needed.

## 2022-08-22 ENCOUNTER — APPOINTMENT (OUTPATIENT)
Dept: MRI IMAGING | Facility: CLINIC | Age: 77
End: 2022-08-22

## 2022-08-24 ENCOUNTER — APPOINTMENT (OUTPATIENT)
Dept: MRI IMAGING | Facility: CLINIC | Age: 77
End: 2022-08-24

## 2022-08-24 ENCOUNTER — OUTPATIENT (OUTPATIENT)
Dept: OUTPATIENT SERVICES | Facility: HOSPITAL | Age: 77
LOS: 1 days | End: 2022-08-24
Payer: MEDICARE

## 2022-08-24 DIAGNOSIS — Z98.89 OTHER SPECIFIED POSTPROCEDURAL STATES: Chronic | ICD-10-CM

## 2022-08-24 DIAGNOSIS — Z96.651 PRESENCE OF RIGHT ARTIFICIAL KNEE JOINT: Chronic | ICD-10-CM

## 2022-08-24 DIAGNOSIS — K43.2 INCISIONAL HERNIA WITHOUT OBSTRUCTION OR GANGRENE: Chronic | ICD-10-CM

## 2022-08-24 DIAGNOSIS — Z96.60 PRESENCE OF UNSPECIFIED ORTHOPEDIC JOINT IMPLANT: Chronic | ICD-10-CM

## 2022-08-24 DIAGNOSIS — Z96.659 PRESENCE OF UNSPECIFIED ARTIFICIAL KNEE JOINT: Chronic | ICD-10-CM

## 2022-08-24 DIAGNOSIS — Z00.8 ENCOUNTER FOR OTHER GENERAL EXAMINATION: ICD-10-CM

## 2022-08-24 DIAGNOSIS — Z90.49 ACQUIRED ABSENCE OF OTHER SPECIFIED PARTS OF DIGESTIVE TRACT: Chronic | ICD-10-CM

## 2022-08-24 PROCEDURE — 73221 MRI JOINT UPR EXTREM W/O DYE: CPT | Mod: 26,RT

## 2022-08-24 PROCEDURE — 73221 MRI JOINT UPR EXTREM W/O DYE: CPT

## 2022-09-22 ENCOUNTER — APPOINTMENT (OUTPATIENT)
Dept: MRI IMAGING | Facility: CLINIC | Age: 77
End: 2022-09-22

## 2022-09-22 ENCOUNTER — OUTPATIENT (OUTPATIENT)
Dept: OUTPATIENT SERVICES | Facility: HOSPITAL | Age: 77
LOS: 1 days | End: 2022-09-22
Payer: MEDICARE

## 2022-09-22 DIAGNOSIS — Z98.89 OTHER SPECIFIED POSTPROCEDURAL STATES: Chronic | ICD-10-CM

## 2022-09-22 DIAGNOSIS — K43.2 INCISIONAL HERNIA WITHOUT OBSTRUCTION OR GANGRENE: Chronic | ICD-10-CM

## 2022-09-22 DIAGNOSIS — Z96.60 PRESENCE OF UNSPECIFIED ORTHOPEDIC JOINT IMPLANT: Chronic | ICD-10-CM

## 2022-09-22 DIAGNOSIS — Z00.8 ENCOUNTER FOR OTHER GENERAL EXAMINATION: ICD-10-CM

## 2022-09-22 DIAGNOSIS — Z96.651 PRESENCE OF RIGHT ARTIFICIAL KNEE JOINT: Chronic | ICD-10-CM

## 2022-09-22 DIAGNOSIS — Z90.49 ACQUIRED ABSENCE OF OTHER SPECIFIED PARTS OF DIGESTIVE TRACT: Chronic | ICD-10-CM

## 2022-09-22 DIAGNOSIS — Z96.659 PRESENCE OF UNSPECIFIED ARTIFICIAL KNEE JOINT: Chronic | ICD-10-CM

## 2022-09-22 PROCEDURE — 73218 MRI UPPER EXTREMITY W/O DYE: CPT

## 2022-09-22 PROCEDURE — 73218 MRI UPPER EXTREMITY W/O DYE: CPT | Mod: 26,LT,76

## 2022-09-24 ENCOUNTER — APPOINTMENT (OUTPATIENT)
Dept: MRI IMAGING | Facility: CLINIC | Age: 77
End: 2022-09-24

## 2022-09-24 ENCOUNTER — OUTPATIENT (OUTPATIENT)
Dept: OUTPATIENT SERVICES | Facility: HOSPITAL | Age: 77
LOS: 1 days | End: 2022-09-24
Payer: MEDICARE

## 2022-09-24 DIAGNOSIS — Z98.89 OTHER SPECIFIED POSTPROCEDURAL STATES: Chronic | ICD-10-CM

## 2022-09-24 DIAGNOSIS — Z96.651 PRESENCE OF RIGHT ARTIFICIAL KNEE JOINT: Chronic | ICD-10-CM

## 2022-09-24 DIAGNOSIS — Z00.8 ENCOUNTER FOR OTHER GENERAL EXAMINATION: ICD-10-CM

## 2022-09-24 DIAGNOSIS — Z90.49 ACQUIRED ABSENCE OF OTHER SPECIFIED PARTS OF DIGESTIVE TRACT: Chronic | ICD-10-CM

## 2022-09-24 DIAGNOSIS — Z96.60 PRESENCE OF UNSPECIFIED ORTHOPEDIC JOINT IMPLANT: Chronic | ICD-10-CM

## 2022-09-24 DIAGNOSIS — K43.2 INCISIONAL HERNIA WITHOUT OBSTRUCTION OR GANGRENE: Chronic | ICD-10-CM

## 2022-09-24 DIAGNOSIS — Z96.659 PRESENCE OF UNSPECIFIED ARTIFICIAL KNEE JOINT: Chronic | ICD-10-CM

## 2022-09-24 PROCEDURE — 73221 MRI JOINT UPR EXTREM W/O DYE: CPT | Mod: 26,RT

## 2022-09-24 PROCEDURE — 73221 MRI JOINT UPR EXTREM W/O DYE: CPT

## 2022-11-18 ENCOUNTER — APPOINTMENT (OUTPATIENT)
Dept: RADIOLOGY | Facility: CLINIC | Age: 77
End: 2022-11-18

## 2022-11-18 ENCOUNTER — OUTPATIENT (OUTPATIENT)
Dept: OUTPATIENT SERVICES | Facility: HOSPITAL | Age: 77
LOS: 1 days | End: 2022-11-18
Payer: MEDICARE

## 2022-11-18 DIAGNOSIS — Z98.89 OTHER SPECIFIED POSTPROCEDURAL STATES: Chronic | ICD-10-CM

## 2022-11-18 DIAGNOSIS — M15.0 PRIMARY GENERALIZED (OSTEO)ARTHRITIS: ICD-10-CM

## 2022-11-18 DIAGNOSIS — Z96.60 PRESENCE OF UNSPECIFIED ORTHOPEDIC JOINT IMPLANT: Chronic | ICD-10-CM

## 2022-11-18 DIAGNOSIS — Z96.651 PRESENCE OF RIGHT ARTIFICIAL KNEE JOINT: Chronic | ICD-10-CM

## 2022-11-18 DIAGNOSIS — Z90.49 ACQUIRED ABSENCE OF OTHER SPECIFIED PARTS OF DIGESTIVE TRACT: Chronic | ICD-10-CM

## 2022-11-18 DIAGNOSIS — K43.2 INCISIONAL HERNIA WITHOUT OBSTRUCTION OR GANGRENE: Chronic | ICD-10-CM

## 2022-11-18 DIAGNOSIS — Z96.659 PRESENCE OF UNSPECIFIED ARTIFICIAL KNEE JOINT: Chronic | ICD-10-CM

## 2022-11-18 PROCEDURE — 73522 X-RAY EXAM HIPS BI 3-4 VIEWS: CPT | Mod: 26

## 2022-11-18 PROCEDURE — 73522 X-RAY EXAM HIPS BI 3-4 VIEWS: CPT

## 2023-04-04 NOTE — PATIENT PROFILE ADULT - EQUIPMENT CURRENTLY USED AT HOME
----- Message from LUCIA Fritz sent at 4/4/2023  5:55 AM CDT -----  GFR has improved (kidney function). Sodium a little low. No change in plan of care.   
Sent pt my[facility] message conveying normal results.  
no

## 2023-06-09 ENCOUNTER — APPOINTMENT (OUTPATIENT)
Dept: ORTHOPEDIC SURGERY | Facility: CLINIC | Age: 78
End: 2023-06-09
Payer: MEDICARE

## 2023-06-09 DIAGNOSIS — Z96.641 PRESENCE OF RIGHT ARTIFICIAL HIP JOINT: ICD-10-CM

## 2023-06-09 DIAGNOSIS — Z96.642 PRESENCE OF LEFT ARTIFICIAL HIP JOINT: ICD-10-CM

## 2023-06-09 DIAGNOSIS — M41.50 OTHER SECONDARY SCOLIOSIS, SITE UNSPECIFIED: ICD-10-CM

## 2023-06-09 DIAGNOSIS — M54.16 RADICULOPATHY, LUMBAR REGION: ICD-10-CM

## 2023-06-09 DIAGNOSIS — M48.07 SPINAL STENOSIS, LUMBOSACRAL REGION: ICD-10-CM

## 2023-06-09 DIAGNOSIS — M47.816 SPONDYLOSIS W/OUT MYELOPATHY OR RADICULOPATHY, LUMBAR REGION: ICD-10-CM

## 2023-06-09 DIAGNOSIS — M51.36 OTHER INTERVERTEBRAL DISC DEGENERATION, LUMBAR REGION: ICD-10-CM

## 2023-06-09 PROCEDURE — 99203 OFFICE O/P NEW LOW 30 MIN: CPT

## 2023-06-09 PROCEDURE — 73503 X-RAY EXAM HIP UNI 4/> VIEWS: CPT

## 2023-06-09 PROCEDURE — 72100 X-RAY EXAM L-S SPINE 2/3 VWS: CPT

## 2023-06-09 RX ORDER — METHYLPREDNISOLONE 4 MG/1
4 TABLET ORAL
Qty: 1 | Refills: 1 | Status: ACTIVE | COMMUNITY
Start: 2023-06-09 | End: 1900-01-01

## 2023-06-09 NOTE — HISTORY OF PRESENT ILLNESS
[Lower back] : lower back [6] : 6 [5] : 5 [Burning] : burning [Shooting] : shooting [Stabbing] : stabbing [Intermittent] : intermittent [Rest] : rest [Exercising] : exercising [Retired] : Work status: retired [de-identified] : 6/9/23  Initial visit for this 77 year male s/p bilateral THR and bilateral TKR, complaining of spontaneous onset of lt sided LBP radiating to lt hip and buttock pain when bending and sometimes walking x last 2 months. Saw his PCP who treated him with a trial of prednisone and MDP which helped. has been in PT 2x/week  x last 4 years duration. Under care of pain management, Dr. Olivia who had a LESI which helped.\par \par PMH: s/p laminectomy L-spine  3/2020 by Dr. Swan\par \par Impression:  2022\par Interval posterior decompression at L3-L4 to L5-S1 with improvement of central canal narrowing at these levels.\par Multilevel degenerative changes throughout the lumbar spine, as detailed above. Interval worsening of right neural foraminal narrowing at L2-L3, now mild/moderate. Interval progression of degenerative disc disease at L2-L3 and L4-L5.\par Remaining findings throughout the lumbar spine are similar in appearance to the prior study.\par  [] : Post Surgical Visit: no [FreeTextEntry1] : l spine  [FreeTextEntry5] : Pt has a hx of multiple l spine issues, pt had a laminectomy and an HNP a few years ago, pt has had multiple epidurals with no relief and has started to have a gradual onset of the same type of back pain  [de-identified] : yoli

## 2023-06-09 NOTE — PHYSICAL EXAM
[Normal Mood and Affect] : normal mood and affect [Able to Communicate] : able to communicate [Well Nourished] : well nourished [Disc space narrowing] : Disc space narrowing [Scoliosis] : Scoliosis [AP] : anteroposterior [de-identified] : overweight [] : negative sitting straight leg raise [FreeTextEntry1] : Left curve.  Lumbar decompression L3-4 through L5-S1.  Multilevel DDD. [de-identified] : THR x2, cementless, well fixed without evidence of loosening. [TWNoteComboBox7] : forward flexion 75 degrees [de-identified] : extension 20 degrees [de-identified] : left lateral bending 30 degrees [de-identified] : right lateral bending 20 degrees

## 2023-06-13 NOTE — DISCHARGE NOTE NURSING/CASE MANAGEMENT/SOCIAL WORK - CAREGIVER ADDRESS
Tami and Delmi, can you see Mr Leal for urostomy teaching and marking please?  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  Thanks! [address] [place] [address]

## 2023-07-28 NOTE — REVIEW OF SYSTEMS
Problem: Discharge Planning  Goal: Discharge to home or other facility with appropriate resources  5/8/2022 1210 by Thaddeus Babb RN  Outcome: Progressing  Flowsheets (Taken 5/8/2022 1210)  Discharge to home or other facility with appropriate resources: Identify barriers to discharge with patient and caregiver  Note: Discharge planning in progress. Patient is awaiting discharge plan. Case management assisting with any discharge needs. Problem: Pain  Goal: Verbalizes/displays adequate comfort level or baseline comfort level  5/8/2022 1210 by Thaddeus Babb RN  Outcome: Progressing  Flowsheets (Taken 5/8/2022 1210)  Verbalizes/displays adequate comfort level or baseline comfort level: Encourage patient to monitor pain and request assistance  Note: Patient reports pain 4/5 in the right hand. Ice packs and elevation used for comfort. Patient's stated pain goal is 3. Problem: Safety - Adult  Goal: Free from fall injury  5/8/2022 1210 by Thaddeus Babb RN  Outcome: Progressing  Note: Patient has remained free of falls during this shift. Appropriate fall prevention measures in place. Patient is compliant with using call light for assistance when needed.;;       Problem: ABCDS Injury Assessment  Goal: Absence of physical injury  5/8/2022 1210 by Thaddeus Babb RN  Outcome: Progressing  Note: Patient has remained free of physical injury during this shift. Safe environment provided, call light within reach, and hourly rounding completed. Problem: Neurosensory - Adult  Goal: Achieves stable or improved neurological status  5/8/2022 1210 by Thaddeus Babb RN  Outcome: Progressing  Flowsheets (Taken 5/8/2022 1210)  Achieves stable or improved neurological status: Assess for and report changes in neurological status  Note: Patient is alert and oriented x 4. Neuro assessment within normal limits.       Problem: Respiratory - Adult  Goal: Achieves optimal ventilation and oxygenation  5/8/2022 1210 by Sofi Braxton RN  Outcome: Progressing  Flowsheets (Taken 5/8/2022 1210)  Achieves optimal ventilation and oxygenation: Assess for changes in respiratory status  Note: No pulmonary complications noted during this shift. Lung sounds remain clear and O2 sats greater than 90% on room air. Problem: Cardiovascular - Adult  Goal: Maintains optimal cardiac output and hemodynamic stability  5/8/2022 1210 by Sofi Braxton RN  Outcome: Progressing  Flowsheets (Taken 5/8/2022 1210)  Maintains optimal cardiac output and hemodynamic stability: Monitor blood pressure and heart rate  Note: VS remain stable during this shift. Problem: Skin/Tissue Integrity - Adult  Goal: Incisions, wounds, or drain sites healing without S/S of infection  Outcome: Progressing  Flowsheets (Taken 5/8/2022 1210)  Incisions, Wounds, or Drain Sites Healing Without Sign and Symptoms of Infection: TWICE DAILY: Assess and document skin integrity  Note: Wound on right thumb remains red, warm, and swollen. Patient is currently doing hibicleans soak and Vancomycin. Problem: Musculoskeletal - Adult  Goal: Return mobility to safest level of function  5/8/2022 1210 by Sofi Braxton RN  Outcome: Progressing  Flowsheets (Taken 5/8/2022 1210)  Return Mobility to Safest Level of Function: Assess patient stability and activity tolerance for standing, transferring and ambulating with or without assistive devices  Note: Patient is independent in room. Problem: Gastrointestinal - Adult  Goal: Maintains or returns to baseline bowel function  Outcome: Progressing  Flowsheets (Taken 5/8/2022 1210)  Maintains or returns to baseline bowel function: Assess bowel function  Note: Bowel sounds active x 4. Patient reports passing gas.       Problem: Genitourinary - Adult  Goal: Absence of urinary retention  5/8/2022 1210 by Sofi Braxton RN  Outcome: Progressing  Flowsheets (Taken 5/8/2022 1210)  Absence of urinary retention: Assess patients ability to void and empty bladder  Note: Patient reports voiding adequate amounts of clear, yellow urine during this shift. Problem: Infection - Adult  Goal: Absence of infection during hospitalization  Outcome: Progressing  Flowsheets (Taken 5/8/2022 1210)  Absence of infection during hospitalization: Assess and monitor for signs and symptoms of infection  Note: Patient remains afebrile and VS stable during this shift. Patient is currently on Flagyl and Vancomycin. Problem: Hematologic - Adult  Goal: Maintains hematologic stability  Outcome: Progressing  Flowsheets (Taken 5/6/2022 1549)  Maintains hematologic stability: Assess for signs and symptoms of bleeding or hemorrhage  Note: No signs of excessive bleeding noted during this shift. Care plan reviewed with patient. Patient verbalize understanding of the plan of care and contribute to goal setting. [Joint Pain] : joint pain [Negative] : Allergic/Immunologic done

## 2024-01-01 NOTE — DISCHARGE NOTE PROVIDER - CARE PROVIDER_API CALL
Tyrese Mayer (DO)  Internal Medicine  [address], [address]  [place] [address]  Phone: [phone]  Fax: [phone]  Follow Up Time:
development milestones
development milestones

## 2024-01-31 ENCOUNTER — APPOINTMENT (OUTPATIENT)
Dept: ULTRASOUND IMAGING | Facility: CLINIC | Age: 79
End: 2024-01-31
Payer: MEDICARE

## 2024-01-31 ENCOUNTER — OUTPATIENT (OUTPATIENT)
Dept: OUTPATIENT SERVICES | Facility: HOSPITAL | Age: 79
LOS: 1 days | End: 2024-01-31
Payer: MEDICARE

## 2024-01-31 DIAGNOSIS — Z98.89 OTHER SPECIFIED POSTPROCEDURAL STATES: Chronic | ICD-10-CM

## 2024-01-31 DIAGNOSIS — Z96.659 PRESENCE OF UNSPECIFIED ARTIFICIAL KNEE JOINT: Chronic | ICD-10-CM

## 2024-01-31 DIAGNOSIS — Z96.651 PRESENCE OF RIGHT ARTIFICIAL KNEE JOINT: Chronic | ICD-10-CM

## 2024-01-31 DIAGNOSIS — Z96.60 PRESENCE OF UNSPECIFIED ORTHOPEDIC JOINT IMPLANT: Chronic | ICD-10-CM

## 2024-01-31 DIAGNOSIS — Z00.8 ENCOUNTER FOR OTHER GENERAL EXAMINATION: ICD-10-CM

## 2024-01-31 DIAGNOSIS — K43.2 INCISIONAL HERNIA WITHOUT OBSTRUCTION OR GANGRENE: Chronic | ICD-10-CM

## 2024-01-31 DIAGNOSIS — Z90.49 ACQUIRED ABSENCE OF OTHER SPECIFIED PARTS OF DIGESTIVE TRACT: Chronic | ICD-10-CM

## 2024-01-31 PROCEDURE — 76882 US LMTD JT/FCL EVL NVASC XTR: CPT | Mod: 26,LT

## 2024-01-31 PROCEDURE — 76882 US LMTD JT/FCL EVL NVASC XTR: CPT

## 2024-07-29 ENCOUNTER — APPOINTMENT (OUTPATIENT)
Dept: ORTHOPEDIC SURGERY | Facility: CLINIC | Age: 79
End: 2024-07-29
Payer: MEDICARE

## 2024-07-29 VITALS
WEIGHT: 230 LBS | HEIGHT: 70 IN | DIASTOLIC BLOOD PRESSURE: 88 MMHG | SYSTOLIC BLOOD PRESSURE: 130 MMHG | HEART RATE: 82 BPM | BODY MASS INDEX: 32.93 KG/M2

## 2024-07-29 DIAGNOSIS — M48.07 SPINAL STENOSIS, LUMBOSACRAL REGION: ICD-10-CM

## 2024-07-29 DIAGNOSIS — M47.816 SPONDYLOSIS W/OUT MYELOPATHY OR RADICULOPATHY, LUMBAR REGION: ICD-10-CM

## 2024-07-29 DIAGNOSIS — M47.817 SPONDYLOSIS W/OUT MYELOPATHY OR RADICULOPATHY, LUMBOSACRAL REGION: ICD-10-CM

## 2024-07-29 PROCEDURE — 99214 OFFICE O/P EST MOD 30 MIN: CPT

## 2024-07-29 PROCEDURE — 72110 X-RAY EXAM L-2 SPINE 4/>VWS: CPT

## 2024-07-29 NOTE — DISCUSSION/SUMMARY
[Medication Risks Reviewed] : Medication risks reviewed [Surgical risks reviewed] : Surgical risks reviewed [1 Week] : in 1 week [de-identified] : Patient needs to return with the images of the MRI that were done at [facility] on 4/14/2023 in order to determine the extent of surgical procedure required to her correct the patient's problem, if possible.  I, Dr. Chi Grayson, personally performed the evaluation and management (E/M) services for this new patient.  That E/M includes conducting the initial examination, assessing all conditions, and establishing a plan of care.  Today, my ACP, Joceline Valladares, was here to observe my evaluation and management services for this patient to be followed going forward. Attending and PA/NP shared services statement (NON-critical care):

## 2024-07-29 NOTE — HISTORY OF PRESENT ILLNESS
[Pain Location] : pain [] : right & left leg [Lumbar] : lumbar region [Worsening] : worsening [3] : a minimum pain level of 3/10 [4] : a maximum pain level of 4/10 [de-identified] :  Patients presents for a follow up visit for Lumbar DDD. Patient was last seen in 2022. Patient is s/p lumbar laminectomy approximately 4-5 years ago with another surgeon. Patient is currently being treated with pain management with the last two injection in January 2024 that lasted 4 months, and last injection given in June 2024 that lasted for one month. Patient can only stand for 2-3 minutes, with left leg weakness. Patient has been undergoing physical therapy for 4 years. Patient has a recent MRI done with [facility] and has not brought in the images.

## 2024-07-29 NOTE — PHYSICAL EXAM
[Knee] : patellar 2+ and symmetric bilaterally [Ankle] : ankle 2+ and symmetric bilaterally [Normal] : Oriented to person, place, and time, insight and judgement were intact and the affect was normal [LE] : Sensory: Intact in bilateral lower extremities [Normal RLE] : Right Lower Extremity: No scars, rashes, lesions, ulcers, skin intact [Normal LLE] : Left Lower Extremity: No scars, rashes, lesions, ulcers, skin intact [de-identified] : Heel and toe walk is intact. Tension signs of bilateral LEs are negative. [de-identified] : Negative FABERs of bilateral hips. Negative palpable tenderness of the greater trochanteric lateral hips. No noted atrophy noted of bilateral LE musculature. [de-identified] :  AP & lateral Flexion and Extension X-Rays of the Lumbar spine taken 07/29/2024 shows AP xray demonstrating a lumbar curvature to the right and the lateral views demonstrating multilevel degenerative disease, no instability seen on the flexion and extension views, L-2-L4 s/p laminectomies, Stenosis seen on L4-5 and end stage spondylosis at L5-S1.  No obvious fracture, no bony tumor.

## 2024-08-05 ENCOUNTER — APPOINTMENT (OUTPATIENT)
Dept: ORTHOPEDIC SURGERY | Facility: CLINIC | Age: 79
End: 2024-08-05

## 2024-08-05 PROCEDURE — 99214 OFFICE O/P EST MOD 30 MIN: CPT

## 2024-08-05 NOTE — HISTORY OF PRESENT ILLNESS
[3] : a minimum pain level of 3/10 [9] : a maximum pain level of 9/10 [Constant] : ~He/She~ states the symptoms seem to be constant [Pain Location] : pain [Lumbar] : lumbar region [de-identified] : Patient is here today to review the recent results of an MRI for the Lumbar spine. Patient states that the pain is constant. He states he feels numbness and weakness in the bilateral lower extremity.

## 2024-08-05 NOTE — PHYSICAL EXAM
[LE] : Sensory: Intact in bilateral lower extremities [Normal] : Oriented to person, place, and time, insight and judgement were intact and the affect was normal [Wide-Based] : wide-based [de-identified] : MRI Evaluation of the Lumbar spine taken on 07/19/2024 shows __  Impression: Spondylosis, bulging intervertebral disc with herniations, posterior disc osteophytes, neutral foraminal spondylitis ridging, facet arthritis and ligamentum flavum atrophy with stenosis of the Lumbar spine without significant interval change above.  Grade 1 anterolisthesis of L4-L5 and L5-S1 Staus post decompression laminectomy L3-S1.  Personal review of the MRI shows decompressive laminectomy from L3-4 to L5-S1.  It shows residual foraminal narrowing which can only be taken care of with interbody fusion.

## 2024-08-05 NOTE — DISCUSSION/SUMMARY
[Surgical risks reviewed] : Surgical risks reviewed [PRN] : PRN [de-identified] :  I have discussed the underlying pathophysiology of the patient's condition and MRI findings with the patient's daughter present.  I expressed to patient that his symptoms are consistent with a disc on herniation on the right side resulting in pinch nerve. I have expressed to the patient that surgical options should not be considered at this time due to the risk of infection or no improvement in pain symptoms.  I have expressed patient can do and EMG testing to obtain additional information about his condition.  Given his age and medical comorbidities it was recommended the patient defer additional operative intervention regarding the lower back as the risks outweigh the benefits.  Patient should follow up with me as needed.

## 2024-08-05 NOTE — ADDENDUM
[FreeTextEntry1] :  I, Ann Pizarrod, acted solely as a scribe for Dr. Chi Grayson on this date 08/05/2024 .  All medical records entries made by the Scribe were at my Dr. Chi Grayson direction and personally dictated by me on 08/05/2024. I have reviewed the chart and agree that the record accurately reflects my personal performance of the history, physical exam, assessment and plan. I have also personally directed, reviewed, and agreed with the chart.

## 2024-08-30 NOTE — DISCUSSION/SUMMARY
Patient is a 86y old  Male who presents with a chief complaint of IV Abx (30 Aug 2024 06:42)    HPI:  86 Y.O.M with PMH of DM tytpe 2 insulin dependent , HTN, HLD, BPH and gout   presented to ED due to L knee pain that started about 2 days ago and noticed that it was getting swollen and warm to touch   so he decided to come to ED  patient of note pt had traumatic LLE calf area on may 2024 wound that was not healing , pt had debridement with Dr. zuniga and was following in wound clinic , currently completely healed   patient denied any fever , chills , nausea , vomiting , abdominal pain , chest pain or SOB   (29 Aug 2024 23:35)      PAST MEDICAL HISTORY:  HTN (hypertension)    DM2 (diabetes mellitus, type 2)    BPH (benign prostatic hyperplasia)    Prostate CA    HLD (hyperlipidemia)    Gout    History of gastroesophageal reflux (GERD)    Shingles        PAST SURGICAL HISTORY:  No significant past surgical history    S/P cataract surgery        FAMILY HISTORY:  FHx: lung cancer        SOCIAL HISTORY:    Allergies    No Known Allergies    Intolerances      Home Medications:  allopurinol 100 mg oral tablet: 1 tab(s) orally once a day (30 Aug 2024 01:58)  aspirin 81 mg oral tablet: orally once a day (30 Aug 2024 01:52)  atorvastatin 10 mg oral tablet: 1 tab(s) orally once a day (30 Aug 2024 01:58)  Flomax 0.4 mg oral capsule: 1 cap(s) orally once a day (30 Aug 2024 01:58)  Insulin Glargine: 12-16 U (30 Aug 2024 01:58)  Januvia 50 mg oral tablet: 1 tab(s) orally once a day (30 Aug 2024 01:58)  losartan 100 mg oral tablet: 1 tab(s) orally once a day (30 Aug 2024 01:53)  Nephplex Rx oral tablet: 1 tab(s) orally (30 Aug 2024 01:58)  NIFEdipine (Eqv-Adalat CC) 60 mg oral tablet, extended release: 1 tab(s) orally once a day (30 Aug 2024 01:55)  pioglitazone 30 mg oral tablet: 1 tab(s) orally once a day (30 Aug 2024 01:58)    MEDICATIONS  (STANDING):  allopurinol 100 milliGRAM(s) Oral daily  aspirin enteric coated 81 milliGRAM(s) Oral daily  atorvastatin 10 milliGRAM(s) Oral at bedtime  dextrose 5%. 1000 milliLiter(s) (50 mL/Hr) IV Continuous <Continuous>  dextrose 5%. 1000 milliLiter(s) (100 mL/Hr) IV Continuous <Continuous>  dextrose 50% Injectable 25 Gram(s) IV Push once  dextrose 50% Injectable 12.5 Gram(s) IV Push once  dextrose 50% Injectable 25 Gram(s) IV Push once  glucagon  Injectable 1 milliGRAM(s) IntraMuscular once  insulin glargine Injectable (LANTUS) 5 Unit(s) SubCutaneous at bedtime  insulin lispro (ADMELOG) corrective regimen sliding scale   SubCutaneous three times a day before meals  losartan 100 milliGRAM(s) Oral daily  meropenem  IVPB 1000 milliGRAM(s) IV Intermittent every 12 hours  NIFEdipine XL 60 milliGRAM(s) Oral daily  tamsulosin 0.4 milliGRAM(s) Oral at bedtime  vancomycin  IVPB 1000 milliGRAM(s) IV Intermittent every 24 hours    MEDICATIONS  (PRN):  dextrose Oral Gel 15 Gram(s) Oral once PRN Blood Glucose LESS THAN 70 milliGRAM(s)/deciliter      REVIEW OF SYSTEMS:  General:   Respiratory: No cough, SOB  Cardiovascular: No CP or Palpitations	  Gastrointestinal: No nausea, Vomiting. No diarrhea  Genitourinary: No urinary complaints	  Musculoskeletal: No leg swelling, No new rash or lesions	  Neurological: 	  all other systems negative    T(F): 98.6 (08-30-24 @ 05:00), Max: 98.6 (08-30-24 @ 05:00)  HR: 76 (08-30-24 @ 05:00) (76 - 88)  BP: 154/70 (08-30-24 @ 05:00) (144/72 - 175/83)  RR: 17 (08-30-24 @ 05:00) (17 - 19)  SpO2: 96% (08-30-24 @ 05:00) (96% - 100%)  Wt(kg): --    PHYSICAL EXAM:  General: NAD  Respiratory: b/l air entry  Cardiovascular: S1 S2  Gastrointestinal: soft  Extremities: edema        08-30    141  |  109<H>  |  19  ----------------------------<  125<H>  3.7   |  27  |  1.10    Ca    8.5      30 Aug 2024 08:10    TPro  6.3  /  Alb  2.8<L>  /  TBili  0.5  /  DBili  x   /  AST  14<L>  /  ALT  22  /  AlkPhos  84  08-30                          11.5   8.52  )-----------( 159      ( 30 Aug 2024 08:10 )             33.6       Hemoglobin: 11.5 g/dL (08-30 @ 08:10)  Hematocrit: 33.6 % (08-30 @ 08:10)  Potassium: 3.7 mmol/L (08-30 @ 08:10)  Blood Urea Nitrogen: 19 mg/dL (08-30 @ 08:10)      Creatinine, Serum: 1.10 (08-30 @ 08:10)  Creatinine, Serum: 1.50 (08-29 @ 20:50)      Urinalysis Basic - ( 30 Aug 2024 08:10 )    Color: x / Appearance: x / SG: x / pH: x  Gluc: 125 mg/dL / Ketone: x  / Bili: x / Urobili: x   Blood: x / Protein: x / Nitrite: x   Leuk Esterase: x / RBC: x / WBC x   Sq Epi: x / Non Sq Epi: x / Bacteria: x      LIVER FUNCTIONS - ( 30 Aug 2024 08:10 )  Alb: 2.8 g/dL / Pro: 6.3 g/dL / ALK PHOS: 84 U/L / ALT: 22 U/L / AST: 14 U/L / GGT: x                       I&O's Detail         [FreeTextEntry1] : The underlying pathophysiology was reviewed with the patient. Discussed at length the nature of the patient’s condition. The bilateral long finger symptoms appear secondary to trigger finger.\par \par The patient wishes to proceed with a cortisone injection at this time (2). The skin was prepped with alcohol and sprayed with Ethyl Chloride. An injection of 0.5 cc 1% Lidocaine without epinephrine, 0.25 cc Kenalog 40mg, and 0.25 cc Dexamethasone was administered into the flexor tendon sheath of the right long fingers. The patient tolerated the procedure well. Apply ice. \par \par The patient wishes to proceed with a cortisone injection at this time (4). The skin was prepped with alcohol and sprayed with Ethyl Chloride. An injection of 0.5 cc 1% Lidocaine without epinephrine, 0.25 cc Kenalog 40mg, and 0.25 cc Dexamethasone was administered into the flexor tendon sheath of the left long fingers. The patient tolerated the procedure well. Apply ice. \par \par All questions answered, understanding verbalized. Patient in agreement with plan of care. Follow up as needed.    Patient is a 86y old  Male who presents with a chief complaint of IV Abx (30 Aug 2024 06:42)    HPI:  86 Y.O.M with PMH of DM tytpe 2 insulin dependent , HTN, HLD, BPH and gout   presented to ED due to L knee pain that started about 2 days ago and noticed that it was getting swollen and warm to touch   so he decided to come to ED  patient of note pt had traumatic LLE calf area on may 2024 wound that was not healing , pt had debridement with Dr. zuniga and was following in wound clinic , currently completely healed   patient denied any fever , chills , nausea , vomiting , abdominal pain , chest pain or SOB   (29 Aug 2024 23:35)    Renal consult called for abnormal renal function. History obtained from chart.     PAST MEDICAL HISTORY:  HTN (hypertension)    DM2 (diabetes mellitus, type 2)    BPH (benign prostatic hyperplasia)    Prostate CA    HLD (hyperlipidemia)    Gout    History of gastroesophageal reflux (GERD)    Shingles        PAST SURGICAL HISTORY:  No significant past surgical history    S/P cataract surgery        FAMILY HISTORY:  FHx: lung cancer        SOCIAL HISTORY: No smoking or alcohol use     Allergies    No Known Allergies    Intolerances      Home Medications:  allopurinol 100 mg oral tablet: 1 tab(s) orally once a day (30 Aug 2024 01:58)  aspirin 81 mg oral tablet: orally once a day (30 Aug 2024 01:52)  atorvastatin 10 mg oral tablet: 1 tab(s) orally once a day (30 Aug 2024 01:58)  Flomax 0.4 mg oral capsule: 1 cap(s) orally once a day (30 Aug 2024 01:58)  Insulin Glargine: 12-16 U (30 Aug 2024 01:58)  Januvia 50 mg oral tablet: 1 tab(s) orally once a day (30 Aug 2024 01:58)  losartan 100 mg oral tablet: 1 tab(s) orally once a day (30 Aug 2024 01:53)  Nephplex Rx oral tablet: 1 tab(s) orally (30 Aug 2024 01:58)  NIFEdipine (Eqv-Adalat CC) 60 mg oral tablet, extended release: 1 tab(s) orally once a day (30 Aug 2024 01:55)  pioglitazone 30 mg oral tablet: 1 tab(s) orally once a day (30 Aug 2024 01:58)    MEDICATIONS  (STANDING):  allopurinol 100 milliGRAM(s) Oral daily  aspirin enteric coated 81 milliGRAM(s) Oral daily  atorvastatin 10 milliGRAM(s) Oral at bedtime  dextrose 5%. 1000 milliLiter(s) (50 mL/Hr) IV Continuous <Continuous>  dextrose 5%. 1000 milliLiter(s) (100 mL/Hr) IV Continuous <Continuous>  dextrose 50% Injectable 25 Gram(s) IV Push once  dextrose 50% Injectable 12.5 Gram(s) IV Push once  dextrose 50% Injectable 25 Gram(s) IV Push once  glucagon  Injectable 1 milliGRAM(s) IntraMuscular once  insulin glargine Injectable (LANTUS) 5 Unit(s) SubCutaneous at bedtime  insulin lispro (ADMELOG) corrective regimen sliding scale   SubCutaneous three times a day before meals  losartan 100 milliGRAM(s) Oral daily  meropenem  IVPB 1000 milliGRAM(s) IV Intermittent every 12 hours  NIFEdipine XL 60 milliGRAM(s) Oral daily  tamsulosin 0.4 milliGRAM(s) Oral at bedtime  vancomycin  IVPB 1000 milliGRAM(s) IV Intermittent every 24 hours    MEDICATIONS  (PRN):  dextrose Oral Gel 15 Gram(s) Oral once PRN Blood Glucose LESS THAN 70 milliGRAM(s)/deciliter      REVIEW OF SYSTEMS:  General: no distress  Respiratory: No cough, SOB  Cardiovascular: No CP or Palpitations	  Gastrointestinal: No nausea, Vomiting. No diarrhea  Genitourinary: No urinary complaints	  Musculoskeletal: No new rash or lesions		  all other systems negative    T(F): 98.6 (08-30-24 @ 05:00), Max: 98.6 (08-30-24 @ 05:00)  HR: 76 (08-30-24 @ 05:00) (76 - 88)  BP: 154/70 (08-30-24 @ 05:00) (144/72 - 175/83)  RR: 17 (08-30-24 @ 05:00) (17 - 19)  SpO2: 96% (08-30-24 @ 05:00) (96% - 100%)  Wt(kg): --    PHYSICAL EXAM:  General: NAD  Respiratory: b/l air entry  Cardiovascular: S1 S2  Gastrointestinal: soft  Extremities: no edema        08-30    141  |  109<H>  |  19  ----------------------------<  125<H>  3.7   |  27  |  1.10    Ca    8.5      30 Aug 2024 08:10    TPro  6.3  /  Alb  2.8<L>  /  TBili  0.5  /  DBili  x   /  AST  14<L>  /  ALT  22  /  AlkPhos  84  08-30                          11.5   8.52  )-----------( 159      ( 30 Aug 2024 08:10 )             33.6       Hemoglobin: 11.5 g/dL (08-30 @ 08:10)  Hematocrit: 33.6 % (08-30 @ 08:10)  Potassium: 3.7 mmol/L (08-30 @ 08:10)  Blood Urea Nitrogen: 19 mg/dL (08-30 @ 08:10)      Creatinine, Serum: 1.10 (08-30 @ 08:10)  Creatinine, Serum: 1.50 (08-29 @ 20:50)      Urinalysis Basic - ( 30 Aug 2024 08:10 )    Color: x / Appearance: x / SG: x / pH: x  Gluc: 125 mg/dL / Ketone: x  / Bili: x / Urobili: x   Blood: x / Protein: x / Nitrite: x   Leuk Esterase: x / RBC: x / WBC x   Sq Epi: x / Non Sq Epi: x / Bacteria: x      LIVER FUNCTIONS - ( 30 Aug 2024 08:10 )  Alb: 2.8 g/dL / Pro: 6.3 g/dL / ALK PHOS: 84 U/L / ALT: 22 U/L / AST: 14 U/L / GGT: x                   < from: CT Lower Extremity No Cont, Left (08.30.24 @ 01:08) >    ACC: [id number] EXAM:  CT LWR EXT LT   ORDERED BY: LORY IVAN     PROCEDURE DATE:  08/30/2024          INTERPRETATION:  CT OF THE LEFT LOWER EXTREMITY    CLINICAL INFORMATION: Abscess evaluation.    COMPARISON: CT dated 5/27/2024    TECHNIQUE: Axial CT images were obtained of the left lower extremity from   the distal femoral diaphysis through the distal tibial diaphysis with   coronal and sagittal reconstructions. No intravenous contrast was   administered.    FINDINGS:    Osseous: Status post remote cemented knee total arthroplasty and   posterior patellar remodeling without significant effusion. Ill-defined   focal region of osteolysis along the anteromedial margin of the tibial   stem with adjacent cortical erosion and overlying soft tissue   inflammatory stranding is new since CT dated 5/27/2024. Subtle osteolysis   surrounding the tip of the tibial stem is also new from prior   examination. No subsidence, periprosthetic fracture, or dislocation.    Soft tissues: Inflammatory stranding most prominent along the   anteromedial margin of the proximal tibial metaphysis, but without   discrete sizable hyperattenuating hematoma or definite drainable   encapsulated fluid collection on this non-IV contrast enhanced   examination. No tracking emphysema..    IMPRESSION:    Status post left knee total arthroplasty with CT evidence for interval   development of focal osteomyelitis along the anteromedial margin of the   tibial component at the level of the metaphysis. Overlyingsoft tissue   phlegmon without definite drainable abscess on this non-IV contrast   enhanced examination. Findings discussed with attending Dr. Reyez at   approximately 9:00 AM on 8/30/2024 by Dr. Jamison.    --- End of Report ---            MARTA JAMISON MD; Attending Radiologist  This document has been electronically signed. Aug 30 2024  8:59AM    < end of copied text >

## 2024-09-25 ENCOUNTER — APPOINTMENT (OUTPATIENT)
Dept: ORTHOPEDIC SURGERY | Facility: CLINIC | Age: 79
End: 2024-09-25
Payer: MEDICARE

## 2024-09-25 DIAGNOSIS — M48.07 SPINAL STENOSIS, LUMBOSACRAL REGION: ICD-10-CM

## 2024-09-25 DIAGNOSIS — Z98.890 OTHER SPECIFIED POSTPROCEDURAL STATES: ICD-10-CM

## 2024-09-25 PROCEDURE — 99214 OFFICE O/P EST MOD 30 MIN: CPT

## 2024-09-25 NOTE — PHYSICAL EXAM
[Wide-Based] : wide-based [LE] : Sensory: Intact in bilateral lower extremities [Normal] : Gait: normal [Obese] : not obese [de-identified] : MRI Evaluation of the Lumbar Spine taken on 09/12/2024 shows   Impression: Status post Lumbar posterior decompression with moderate spondylotic changes at multiple levels. Moderate to severe right and mild to moderate left foraminal narrowing at the L3-4. Moderate right and mild left foraminal narrowing of the L2-3. Mild to moderate foraminal narrowing bilaterally at L4-5 and L5-S1.

## 2024-09-25 NOTE — DISCUSSION/SUMMARY
[Surgical risks reviewed] : Surgical risks reviewed [PRN] : PRN [de-identified] :  I have discussed the underlying pathophysiology of the patient's condition. I have expressed to the patient that at this time surgery should not be considered due to his age. We discussed with the patient should consult with his pain management specialist regarding a dorsal column stimulator to help manage his pain.  I have recommended patient should remain to stay active. The patient should follow up with me as needed.

## 2024-09-25 NOTE — HISTORY OF PRESENT ILLNESS
[Pain Location] : pain [] : right hip [Lumbar] : lumbar region [Stable] : stable [3] : a current pain level of 3/10 [de-identified] : Patient is here today to review the recent results of an MRI for Lumbar spine. The patient notes on the night 09/04/2024 he had pain in the right hip that radiated to the testicles. He states the pain was 10/10 on that night. He notes about week after the pain began, he received an epidural and the testicular pain was resolved, and right hip pain still remained.

## 2024-09-25 NOTE — ADDENDUM
[FreeTextEntry1] :  I, Ann Pizarrod, acted solely as a scribe for Dr. Chi Grayson on this date 09/25/2024 .  All medical records entries made by the Scribe were at my Dr. Chi Grayson direction and personally dictated by me on 09/25/2024. I have reviewed the chart and agree that the record accurately reflects my personal performance of the history, physical exam, assessment and plan. I have also personally directed, reviewed, and agreed with the chart.

## 2024-12-09 NOTE — DISCHARGE NOTE PROVIDER - PROVIDER TOKENS
Subjective   Above noted. Minimal use of BiPAP.  Subjective   No acute respiratory complaints from patient, rn noted some increased chest congestion on exam.     Review of Systems   Unable to perform ROS: Dementia        Objective     Objective   Visit Vitals  /45   Pulse (!) 58   Temp 97.5 °F (36.4 °C) (Oral)   Resp 13   Ht 5' 5\" (1.651 m)   Wt 108 kg (238 lb 1.6 oz)   SpO2 95%   BMI 39.62 kg/m²     Oxygen flowing at 2 L/min    Physical Exam  Confusion evident.  Appears alert. No distress at rest. Conjunctiva pink and sclera anicteric. No JVD. Breath sounds diminished with minimal rhonchi noted. Heart rhythm regular. Abdomen soft and nontender. No hepatosplenomegaly. Skin is warm, dry, and intact. No clubbing, cyanosis, or significant edema.     I/O's       Intake/Output Summary (Last 24 hours) at 12/9/2024 1253  Last data filed at 12/9/2024 0400  Gross per 24 hour   Intake 200 ml   Output 800 ml   Net -600 ml         Labs     Recent Labs   Lab 12/09/24  0629 12/05/24  1025 12/04/24  0502   WBC 5.2 5.3 6.4   HCT 28.3* 26.9* 28.8*   HGB 9.0* 9.0* 9.5*    262 258     Recent Labs   Lab 12/09/24  0623 12/06/24  1214 12/05/24  1013   SODIUM 136 135 131*   POTASSIUM 5.1 4.6 4.6   CHLORIDE 102 98 94*   CO2 33* 39* 38*   GLUCOSE 91 91 110*   BUN 47* 45* 42*   CREATININE 1.11* 1.52* 1.58*         Imaging     CTA CHEST PULMONARY EMBOLISM   Final Result   1.   No evidence of acute pulmonary embolus.   2.   Small opacities posterior lung bases likely atelectasis without large   infiltrate or effusion.      Electronically Signed by: KIERA ARROYO M.D.    Signed on: 12/3/2024 12:07 PM    Workstation ID: [id number]       VASC LOWER EXTREMITY VENOUS DUPLEX BILATERAL   Final Result   No evidence for acute deep venous thrombosis of either lower   extremity.      Electronically Signed by: OSIEL ISRAEL M.D.    Signed on: 12/2/2024 10:09 AM    Workstation ID: [id number]      XR CHEST AP OR PA   Final Result       Stable enlargement of the cardiomediastinal silhouette with possible mild   pulmonary vascular congestion. No significant interval change since the   previous exam.               Electronically Signed by: CALEB ALEJANDRA M.D.    Signed on: 12/1/2024 1:52 PM    Workstation ID: [id number]      CT HEAD WO CONTRAST   Final Result      No CT evidence of an acute intracranial abnormality.            Electronically Signed by: CALEB ALEJANDRA M.D.    Signed on: 12/1/2024 12:41 PM    Workstation ID: [id number]            Diagnosis   Acute on chronic hypoxic respiratory failure   -- Baseline 2L O2      Chronic hypercapnic respiratory failure   -- ABG 12/01/24: pH 7.42, pCO2 77, pO2 175     COPD   --Possible  --Likely asthma with chronic airflow Obstruction  --CTA chest 12/3/2024 with no evidence of acute pulmonary embolus, small opacities posterior lung bases likely atelectasis     Acute on chronic CHF      Elevated D-dimer      AMS  Dementia       Plan   Chest x-ray today     Continue to offer BiPAP with sleep, okay if patient cannot tolerate    Keep O2 saturation from 92-95%.    DVT Prophylaxis  heparin (porcine) - 5000 UNIT/ML    Discussed with RN  Inpatient Medications     Current Facility-Administered Medications   Medication    nystatin (MYCOSTATIN) powder    Potassium Standard Replacement Protocol (Levels 3.5 and lower)    Magnesium Standard Replacement Protocol    sodium chloride 0.9 % injection 10 mL    sodium chloride 0.9 % injection 2 mL    sodium chloride (NORMAL SALINE) 0.9 % bolus 500 mL    heparin (porcine) injection 5,000 Units    acetaminophen (TYLENOL) tablet 650 mg    Or    acetaminophen (TYLENOL) suppository 650 mg    ondansetron (ZOFRAN ODT) disintegrating tablet 4 mg    Or    ondansetron (ZOFRAN) injection 4 mg    polyethylene glycol (MIRALAX) packet 17 g    melatonin tablet 3 mg    ipratropium-albuterol (DUONEB) 0.5-2.5 (3) MG/3ML nebulizer solution 3 mL    aspirin chewable 81 mg    atorvastatin  (LIPITOR) tablet 10 mg    donepezil (ARICEPT) tablet 10 mg    escitalopram (LEXAPRO) tablet 5 mg    hydrALAZINE (APRESOLINE) tablet 50 mg    isosorbide mononitrate (IMDUR) ER tablet 120 mg    loratadine (CLARITIN) tablet 10 mg    magnesium oxide (MAG-OX) tablet 400 mg    OLANZapine (ZyPREXA) tablet 7.5 mg    traMADol (ULTRAM) tablet 50 mg    OLANZapine (ZyPREXA) tablet 5 mg    LORazepam (ATIVAN) injection 0.5 mg    dextrose 50 % injection 25 g    dextrose 50 % injection 12.5 g    glucagon (GLUCAGEN) injection 1 mg    dextrose (GLUTOSE) 40 % gel 15 g    dextrose (GLUTOSE) 40 % gel 30 g    insulin lispro (ADMELOG,HumaLOG) - Correction Dose      PROVIDER:[TOKEN:[[id number]]]

## 2025-03-14 NOTE — PATIENT PROFILE ADULT - NSPROEXTENSIONSOFSELF_GEN_A_NUR
Patient discharged home per ERP. Discharge teaching, education, and POC discussed with patient who verbalizes understanding. Patient instructed to return to the ER if symptoms worsen. No other questions at this time.    Rx sent to patient's pharmacy.     PIV removed and dressing applied. Vitals are stable. Patient alert and oriented.  Pt ambulated off unit safely.     none

## 2025-05-01 NOTE — ED ADULT NURSE NOTE - CHPI ED NUR SYMPTOMS NEG
Orders:    ZEPBOUND 2.5 MG/0.5ML SOAJ subCUTAneous auto-injector pen; Inject 2.5 mg into the skin once a week     no abrasion/no bleeding/no deformity/no fever/no loss of consciousness/no numbness/no tingling/no vomiting/no weakness